# Patient Record
Sex: MALE | Race: BLACK OR AFRICAN AMERICAN | NOT HISPANIC OR LATINO | Employment: FULL TIME | URBAN - METROPOLITAN AREA
[De-identification: names, ages, dates, MRNs, and addresses within clinical notes are randomized per-mention and may not be internally consistent; named-entity substitution may affect disease eponyms.]

---

## 2024-08-26 ENCOUNTER — HOSPITAL ENCOUNTER (INPATIENT)
Facility: HOSPITAL | Age: 54
LOS: 1 days | Discharge: HOME | DRG: 065 | End: 2024-08-27
Attending: STUDENT IN AN ORGANIZED HEALTH CARE EDUCATION/TRAINING PROGRAM | Admitting: PSYCHIATRY & NEUROLOGY
Payer: COMMERCIAL

## 2024-08-26 DIAGNOSIS — I63.312 CEREBROVASCULAR ACCIDENT (CVA) DUE TO THROMBOSIS OF LEFT MIDDLE CEREBRAL ARTERY: ICD-10-CM

## 2024-08-26 DIAGNOSIS — I63.9 STROKE: ICD-10-CM

## 2024-08-26 DIAGNOSIS — I49.9 DYSRHYTHMIA: ICD-10-CM

## 2024-08-26 DIAGNOSIS — R29.818 ACUTE FOCAL NEUROLOGICAL DEFICIT: ICD-10-CM

## 2024-08-26 DIAGNOSIS — R00.0 TACHYCARDIA: ICD-10-CM

## 2024-08-26 DIAGNOSIS — K11.8 PAROTID NODULE: Primary | ICD-10-CM

## 2024-08-26 PROBLEM — R73.03 PREDIABETES: Status: ACTIVE | Noted: 2024-08-26

## 2024-08-26 PROBLEM — G81.90 HEMIPARESIS: Status: ACTIVE | Noted: 2024-08-26

## 2024-08-26 PROBLEM — E87.6 HYPOKALEMIA: Status: ACTIVE | Noted: 2024-08-26

## 2024-08-26 PROBLEM — E78.2 HYPERLIPIDEMIA, MIXED: Status: ACTIVE | Noted: 2024-08-26

## 2024-08-26 LAB
ALBUMIN SERPL BCP-MCNC: 3.3 G/DL (ref 3.5–5.2)
ALP SERPL-CCNC: 68 U/L (ref 55–135)
ALT SERPL W/O P-5'-P-CCNC: 39 U/L (ref 10–44)
ANION GAP SERPL CALC-SCNC: 12 MMOL/L (ref 8–16)
ASCENDING AORTA: 3.08 CM
AST SERPL-CCNC: 25 U/L (ref 10–40)
AV INDEX (PROSTH): 0.81
AV MEAN GRADIENT: 5 MMHG
AV PEAK GRADIENT: 8 MMHG
AV VALVE AREA BY VELOCITY RATIO: 2.97 CM²
AV VALVE AREA: 3.35 CM²
AV VELOCITY RATIO: 0.72
BASOPHILS # BLD AUTO: 0.1 K/UL (ref 0–0.2)
BASOPHILS NFR BLD: 1.4 % (ref 0–1.9)
BILIRUB SERPL-MCNC: 0.8 MG/DL (ref 0.1–1)
BSA FOR ECHO PROCEDURE: 2.09 M2
BUN SERPL-MCNC: 11 MG/DL (ref 6–20)
CALCIUM SERPL-MCNC: 9.4 MG/DL (ref 8.7–10.5)
CHLORIDE SERPL-SCNC: 106 MMOL/L (ref 95–110)
CHOLEST SERPL-MCNC: 185 MG/DL (ref 120–199)
CHOLEST/HDLC SERPL: 7.1 {RATIO} (ref 2–5)
CO2 SERPL-SCNC: 22 MMOL/L (ref 23–29)
CREAT SERPL-MCNC: 1.3 MG/DL (ref 0.5–1.4)
CREAT SERPL-MCNC: 1.4 MG/DL (ref 0.5–1.4)
CV ECHO LV RWT: 0.26 CM
DIFFERENTIAL METHOD BLD: ABNORMAL
DOP CALC AO PEAK VEL: 1.44 M/S
DOP CALC AO VTI: 25.32 CM
DOP CALC LVOT AREA: 4.1 CM2
DOP CALC LVOT DIAMETER: 2.29 CM
DOP CALC LVOT PEAK VEL: 1.04 M/S
DOP CALC LVOT STROKE VOLUME: 84.8 CM3
DOP CALCLVOT PEAK VEL VTI: 20.6 CM
E WAVE DECELERATION TIME: 150.34 MSEC
E/A RATIO: 2
E/E' RATIO: 5.52 M/S
ECHO LV POSTERIOR WALL: 0.76 CM (ref 0.6–1.1)
EOSINOPHIL # BLD AUTO: 0.3 K/UL (ref 0–0.5)
EOSINOPHIL NFR BLD: 4.4 % (ref 0–8)
ERYTHROCYTE [DISTWIDTH] IN BLOOD BY AUTOMATED COUNT: 13.1 % (ref 11.5–14.5)
EST. GFR  (NO RACE VARIABLE): >60 ML/MIN/1.73 M^2
ESTIMATED AVG GLUCOSE: 131 MG/DL (ref 68–131)
FRACTIONAL SHORTENING: 36 % (ref 28–44)
GLUCOSE SERPL-MCNC: 141 MG/DL (ref 70–110)
HBA1C MFR BLD: 6.2 % (ref 4–5.6)
HCT VFR BLD AUTO: 37.2 % (ref 40–54)
HDLC SERPL-MCNC: 26 MG/DL (ref 40–75)
HDLC SERPL: 14.1 % (ref 20–50)
HGB BLD-MCNC: 11.8 G/DL (ref 14–18)
IMM GRANULOCYTES # BLD AUTO: 0.03 K/UL (ref 0–0.04)
IMM GRANULOCYTES NFR BLD AUTO: 0.4 % (ref 0–0.5)
INR PPP: 1 (ref 0.8–1.2)
INTERVENTRICULAR SEPTUM: 0.79 CM (ref 0.6–1.1)
IVRT: 98.95 MSEC
LA MAJOR: 5.21 CM
LA MINOR: 5.21 CM
LA WIDTH: 3.64 CM
LDLC SERPL CALC-MCNC: 122.6 MG/DL (ref 63–159)
LEFT ATRIUM SIZE: 3.87 CM
LEFT ATRIUM VOLUME INDEX MOD: 21.2 ML/M2
LEFT ATRIUM VOLUME INDEX: 30.6 ML/M2
LEFT ATRIUM VOLUME MOD: 43.18 CM3
LEFT ATRIUM VOLUME: 62.38 CM3
LEFT INTERNAL DIMENSION IN SYSTOLE: 3.79 CM (ref 2.1–4)
LEFT VENTRICLE DIASTOLIC VOLUME INDEX: 85.41 ML/M2
LEFT VENTRICLE DIASTOLIC VOLUME: 174.24 ML
LEFT VENTRICLE MASS INDEX: 86 G/M2
LEFT VENTRICLE SYSTOLIC VOLUME INDEX: 30.2 ML/M2
LEFT VENTRICLE SYSTOLIC VOLUME: 61.52 ML
LEFT VENTRICULAR INTERNAL DIMENSION IN DIASTOLE: 5.92 CM (ref 3.5–6)
LEFT VENTRICULAR MASS: 174.79 G
LV LATERAL E/E' RATIO: 4.46 M/S
LV SEPTAL E/E' RATIO: 7.25 M/S
LYMPHOCYTES # BLD AUTO: 1.8 K/UL (ref 1–4.8)
LYMPHOCYTES NFR BLD: 25.4 % (ref 18–48)
MCH RBC QN AUTO: 27.2 PG (ref 27–31)
MCHC RBC AUTO-ENTMCNC: 31.7 G/DL (ref 32–36)
MCV RBC AUTO: 86 FL (ref 82–98)
MONOCYTES # BLD AUTO: 1.1 K/UL (ref 0.3–1)
MONOCYTES NFR BLD: 15.1 % (ref 4–15)
MV A" WAVE DURATION": 8.37 MSEC
MV PEAK A VEL: 0.29 M/S
MV PEAK E VEL: 0.58 M/S
MV STENOSIS PRESSURE HALF TIME: 43.6 MS
MV VALVE AREA P 1/2 METHOD: 5.05 CM2
NEUTROPHILS # BLD AUTO: 3.8 K/UL (ref 1.8–7.7)
NEUTROPHILS NFR BLD: 53.3 % (ref 38–73)
NONHDLC SERPL-MCNC: 159 MG/DL
NRBC BLD-RTO: 0 /100 WBC
OHS LV EJECTION FRACTION SIMPSONS BIPLANE MOD: 57 %
OHS QRS DURATION: 98 MS
OHS QTC CALCULATION: 462 MS
PISA TR MAX VEL: 1.97 M/S
PLATELET # BLD AUTO: 337 K/UL (ref 150–450)
PMV BLD AUTO: 8.9 FL (ref 9.2–12.9)
POC PTINR: 1.1 (ref 0.9–1.2)
POC PTWBT: 13 SEC (ref 9.7–14.3)
POTASSIUM SERPL-SCNC: 3.4 MMOL/L (ref 3.5–5.1)
PROT SERPL-MCNC: 7.4 G/DL (ref 6–8.4)
PROTHROMBIN TIME: 10.7 SEC (ref 9–12.5)
PULM VEIN S/D RATIO: 1.13
PV PEAK D VEL: 0.54 M/S
PV PEAK S VEL: 0.61 M/S
RA MAJOR: 4.78 CM
RA PRESSURE ESTIMATED: 3 MMHG
RA WIDTH: 4.29 CM
RBC # BLD AUTO: 4.34 M/UL (ref 4.6–6.2)
RIGHT VENTRICLE DIASTOLIC BASEL DIMENSION: 3.6 CM
RV TB RVSP: 5 MMHG
SAMPLE: NORMAL
SAMPLE: NORMAL
SINUS: 2.94 CM
SODIUM SERPL-SCNC: 140 MMOL/L (ref 136–145)
STJ: 2.38 CM
TDI LATERAL: 0.13 M/S
TDI SEPTAL: 0.08 M/S
TDI: 0.11 M/S
TR MAX PG: 16 MMHG
TRICUSPID ANNULAR PLANE SYSTOLIC EXCURSION: 2.21 CM
TRIGL SERPL-MCNC: 182 MG/DL (ref 30–150)
TROPONIN I SERPL DL<=0.01 NG/ML-MCNC: 0.02 NG/ML (ref 0–0.03)
TSH SERPL DL<=0.005 MIU/L-ACNC: 2.02 UIU/ML (ref 0.4–4)
TV REST PULMONARY ARTERY PRESSURE: 19 MMHG
WBC # BLD AUTO: 7.1 K/UL (ref 3.9–12.7)
Z-SCORE OF LEFT VENTRICULAR DIMENSION IN END DIASTOLE: -0.27
Z-SCORE OF LEFT VENTRICULAR DIMENSION IN END SYSTOLE: 0.13

## 2024-08-26 PROCEDURE — 93010 ELECTROCARDIOGRAM REPORT: CPT | Mod: ,,, | Performed by: INTERNAL MEDICINE

## 2024-08-26 PROCEDURE — 83036 HEMOGLOBIN GLYCOSYLATED A1C: CPT | Performed by: NURSE PRACTITIONER

## 2024-08-26 PROCEDURE — 85025 COMPLETE CBC W/AUTO DIFF WBC: CPT

## 2024-08-26 PROCEDURE — 25500020 PHARM REV CODE 255: Performed by: STUDENT IN AN ORGANIZED HEALTH CARE EDUCATION/TRAINING PROGRAM

## 2024-08-26 PROCEDURE — 25000003 PHARM REV CODE 250: Performed by: STUDENT IN AN ORGANIZED HEALTH CARE EDUCATION/TRAINING PROGRAM

## 2024-08-26 PROCEDURE — 99285 EMERGENCY DEPT VISIT HI MDM: CPT | Mod: 25

## 2024-08-26 PROCEDURE — 25000003 PHARM REV CODE 250

## 2024-08-26 PROCEDURE — 99900035 HC TECH TIME PER 15 MIN (STAT)

## 2024-08-26 PROCEDURE — 93005 ELECTROCARDIOGRAM TRACING: CPT

## 2024-08-26 PROCEDURE — 80061 LIPID PANEL: CPT

## 2024-08-26 PROCEDURE — 84443 ASSAY THYROID STIM HORMONE: CPT

## 2024-08-26 PROCEDURE — 80053 COMPREHEN METABOLIC PANEL: CPT

## 2024-08-26 PROCEDURE — 94761 N-INVAS EAR/PLS OXIMETRY MLT: CPT | Mod: XB

## 2024-08-26 PROCEDURE — 63600175 PHARM REV CODE 636 W HCPCS: Performed by: NURSE PRACTITIONER

## 2024-08-26 PROCEDURE — 85610 PROTHROMBIN TIME: CPT

## 2024-08-26 PROCEDURE — 99223 1ST HOSP IP/OBS HIGH 75: CPT | Mod: ,,, | Performed by: PSYCHIATRY & NEUROLOGY

## 2024-08-26 PROCEDURE — 11000001 HC ACUTE MED/SURG PRIVATE ROOM

## 2024-08-26 PROCEDURE — 82565 ASSAY OF CREATININE: CPT

## 2024-08-26 PROCEDURE — 82803 BLOOD GASES ANY COMBINATION: CPT

## 2024-08-26 PROCEDURE — 25000003 PHARM REV CODE 250: Performed by: NURSE PRACTITIONER

## 2024-08-26 PROCEDURE — 84484 ASSAY OF TROPONIN QUANT: CPT

## 2024-08-26 RX ORDER — ENOXAPARIN SODIUM 100 MG/ML
40 INJECTION SUBCUTANEOUS EVERY 24 HOURS
Status: DISCONTINUED | OUTPATIENT
Start: 2024-08-26 | End: 2024-08-27 | Stop reason: HOSPADM

## 2024-08-26 RX ORDER — ATORVASTATIN CALCIUM 40 MG/1
40 TABLET, FILM COATED ORAL DAILY
Status: DISCONTINUED | OUTPATIENT
Start: 2024-08-26 | End: 2024-08-27 | Stop reason: HOSPADM

## 2024-08-26 RX ORDER — BISACODYL 10 MG/1
10 SUPPOSITORY RECTAL DAILY PRN
Status: DISCONTINUED | OUTPATIENT
Start: 2024-08-26 | End: 2024-08-27 | Stop reason: HOSPADM

## 2024-08-26 RX ORDER — NAPROXEN SODIUM 220 MG/1
81 TABLET, FILM COATED ORAL DAILY
Status: DISCONTINUED | OUTPATIENT
Start: 2024-08-26 | End: 2024-08-27 | Stop reason: HOSPADM

## 2024-08-26 RX ORDER — SODIUM CHLORIDE 0.9 % (FLUSH) 0.9 %
10 SYRINGE (ML) INJECTION
Status: DISCONTINUED | OUTPATIENT
Start: 2024-08-26 | End: 2024-08-27 | Stop reason: HOSPADM

## 2024-08-26 RX ORDER — CALCIUM CARBONATE 200(500)MG
500 TABLET,CHEWABLE ORAL 3 TIMES DAILY PRN
Status: DISCONTINUED | OUTPATIENT
Start: 2024-08-26 | End: 2024-08-27 | Stop reason: HOSPADM

## 2024-08-26 RX ORDER — LABETALOL HCL 20 MG/4 ML
10 SYRINGE (ML) INTRAVENOUS EVERY 6 HOURS PRN
Status: DISCONTINUED | OUTPATIENT
Start: 2024-08-26 | End: 2024-08-27 | Stop reason: HOSPADM

## 2024-08-26 RX ORDER — CLOPIDOGREL BISULFATE 75 MG/1
75 TABLET ORAL DAILY
Status: DISCONTINUED | OUTPATIENT
Start: 2024-08-26 | End: 2024-08-27 | Stop reason: HOSPADM

## 2024-08-26 RX ORDER — METOPROLOL TARTRATE 25 MG/1
25 TABLET, FILM COATED ORAL ONCE
Status: COMPLETED | OUTPATIENT
Start: 2024-08-26 | End: 2024-08-26

## 2024-08-26 RX ORDER — ONDANSETRON HYDROCHLORIDE 2 MG/ML
4 INJECTION, SOLUTION INTRAVENOUS EVERY 12 HOURS PRN
Status: DISCONTINUED | OUTPATIENT
Start: 2024-08-26 | End: 2024-08-27 | Stop reason: HOSPADM

## 2024-08-26 RX ORDER — ACETAMINOPHEN 325 MG/1
650 TABLET ORAL EVERY 6 HOURS PRN
Status: DISCONTINUED | OUTPATIENT
Start: 2024-08-26 | End: 2024-08-27 | Stop reason: HOSPADM

## 2024-08-26 RX ADMIN — ACETAMINOPHEN 650 MG: 325 TABLET ORAL at 05:08

## 2024-08-26 RX ADMIN — ASPIRIN 81 MG CHEWABLE TABLET 81 MG: 81 TABLET CHEWABLE at 09:08

## 2024-08-26 RX ADMIN — CLOPIDOGREL BISULFATE 75 MG: 75 TABLET ORAL at 03:08

## 2024-08-26 RX ADMIN — POTASSIUM BICARBONATE 30 MEQ: 391 TABLET, EFFERVESCENT ORAL at 09:08

## 2024-08-26 RX ADMIN — METOPROLOL TARTRATE 25 MG: 25 TABLET, FILM COATED ORAL at 06:08

## 2024-08-26 RX ADMIN — ATORVASTATIN CALCIUM 40 MG: 40 TABLET, FILM COATED ORAL at 09:08

## 2024-08-26 RX ADMIN — ENOXAPARIN SODIUM 40 MG: 40 INJECTION SUBCUTANEOUS at 06:08

## 2024-08-26 RX ADMIN — IOHEXOL 100 ML: 350 INJECTION, SOLUTION INTRAVENOUS at 02:08

## 2024-08-26 NOTE — ED TRIAGE NOTES
Yannick Santiago, a 53 y.o. male presents to the ED w/ complaint of R sided slurred speech, R sided facial droop, and R arm/ R leg weakness starting when he woke up, Pt went to sleep at approx 2100 last night.     Triage note:  Chief Complaint   Patient presents with    Facial Droop    Extremity Weakness    slurred speech     Pt arrives via EMS after waking up with slurred speech, right sided limb weakness and right sided facial droop.     Review of patient's allergies indicates:  No Known Allergies  No past medical history on file.

## 2024-08-26 NOTE — NURSING
Nurses Note -- 4 Eyes      8/26/2024   6:03 AM      Skin assessed during: Admit      [x] No Altered Skin Integrity Present    [x]Prevention Measures Documented      [] Yes- Altered Skin Integrity Present or Discovered   [] LDA Added if Not in Epic (Describe Wound)   [] New Altered Skin Integrity was Present on Admit and Documented in LDA   [] Wound Image Taken    Wound Care Consulted? No    Attending Nurse:  Vanessa Cage RN/Staff Member:   SABINA Ruiz

## 2024-08-26 NOTE — HPI
54 y/o male who went to sleep at 2100 and woke up at 0130 with right sided facial droop, dysarthria and right sided weakness.  Patient brought in by EMS.  Stroke code activated  Upon my examination patient symptoms have resolved NIHSS 0  Initial /83  CTA head and neck multiphase with no LVO or acute process seen. No thrombolytics as out of window and resolved symptoms.   Patient has not seen a MD in years and is not on any meds.  MRI brain w/o contrast

## 2024-08-26 NOTE — CARE UPDATE
I have reviewed the chart of Yannick Santiago who is hospitalized for the following:    Active Hospital Problems    Diagnosis    *Cerebrovascular accident (CVA) due to thrombosis of left middle cerebral artery    Hyperlipidemia, mixed    Prediabetes    Parotid nodule    Body mass index (BMI) of 32.0 to 32.9 in adult    Hemiparesis     Therapy to evaluate and treat       Hypokalemia     POA  Monitor with daily cmp replace          Daphney Huynh NP  [facility]

## 2024-08-26 NOTE — ED PROVIDER NOTES
Encounter Date: 8/26/2024       History     Chief Complaint   Patient presents with    Facial Droop    Extremity Weakness    slurred speech     Pt arrives via EMS after waking up with slurred speech, right sided limb weakness and right sided facial droop.     Pt is a 53 year old male who presents for evaluation of slurred speech, which was noticed upon waking at approximately 1:30 AM. Pt states he went to bed at 9 PM without symptoms. He awoke with slurred speech noticed by his significant other. EMS called who noted development of right sided weakness and right facial droop. Pt denies any hx of similar symptoms. No blood thinner medication regimen.     The history is provided by the patient.     Review of patient's allergies indicates:  No Known Allergies  History reviewed. No pertinent past medical history.  History reviewed. No pertinent surgical history.  No family history on file.  Social History     Tobacco Use    Smoking status: Former     Types: Cigarettes   Substance Use Topics    Alcohol use: No    Drug use: No     Review of Systems    Physical Exam     Initial Vitals   BP Pulse Resp Temp SpO2   08/26/24 0213 08/26/24 0213 08/26/24 0213 08/26/24 0229 08/26/24 0213   (!) 155/90 108 (!) 24 98.3 °F (36.8 °C) 100 %      MAP       --                Physical Exam    Nursing note and vitals reviewed.  Constitutional: He appears well-developed and well-nourished. No distress.   HENT:   Head: Normocephalic and atraumatic.   Eyes: EOM are normal. Pupils are equal, round, and reactive to light.   Neck: Neck supple.   Normal range of motion.  Cardiovascular:  Normal rate, regular rhythm and intact distal pulses.           No murmur heard.  Pulmonary/Chest: Breath sounds normal. No respiratory distress. He has no wheezes. He has no rales.   Abdominal: Abdomen is soft. He exhibits no distension.   Musculoskeletal:         General: No edema.      Cervical back: Normal range of motion and neck supple.     Neurological: He  is alert and oriented to person, place, and time. No sensory deficit.   5/5 strength in LUE and LLE. 3/5 strength RLE. 1/5 strength RUE. Right facial droop. Mild slurred speech   Skin: Skin is warm and dry.         ED Course   Procedures  Labs Reviewed   CBC W/ AUTO DIFFERENTIAL - Abnormal       Result Value    WBC 7.10      RBC 4.34 (*)     Hemoglobin 11.8 (*)     Hematocrit 37.2 (*)     MCV 86      MCH 27.2      MCHC 31.7 (*)     RDW 13.1      Platelets 337      MPV 8.9 (*)     Immature Granulocytes 0.4      Gran # (ANC) 3.8      Immature Grans (Abs) 0.03      Lymph # 1.8      Mono # 1.1 (*)     Eos # 0.3      Baso # 0.10      nRBC 0      Gran % 53.3      Lymph % 25.4      Mono % 15.1 (*)     Eosinophil % 4.4      Basophil % 1.4      Differential Method Automated     COMPREHENSIVE METABOLIC PANEL - Abnormal    Sodium 140      Potassium 3.4 (*)     Chloride 106      CO2 22 (*)     Glucose 141 (*)     BUN 11      Creatinine 1.3      Calcium 9.4      Total Protein 7.4      Albumin 3.3 (*)     Total Bilirubin 0.8      Alkaline Phosphatase 68      AST 25      ALT 39      eGFR >60.0      Anion Gap 12     LIPID PANEL - Abnormal    Cholesterol 185      Triglycerides 182 (*)     HDL 26 (*)     LDL Cholesterol 122.6      HDL/Cholesterol Ratio 14.1 (*)     Total Cholesterol/HDL Ratio 7.1 (*)     Non-HDL Cholesterol 159     HEMOGLOBIN A1C - Abnormal    Hemoglobin A1C 6.2 (*)     Estimated Avg Glucose 131     PROTIME-INR    Prothrombin Time 10.7      INR 1.0     TSH    TSH 2.025     TROPONIN I   POCT GLUCOSE, HAND-HELD DEVICE   ISTAT CREATININE    POC Creatinine 1.4      Sample unknown     ISTAT PROCEDURE    POC PTWBT 13.0      POC PTINR 1.1      Sample unknown          ECG Results              ECG 12 lead (Final result)        Collection Time Result Time QRS Duration OHS QTC Calculation    08/26/24 02:27:08 08/26/24 08:30:58 98 462                     Final result by Interface, Lab In [facility] (08/26/24 08:31:05)                    Narrative:    Test Reason : R29.818,    Vent. Rate : 095 BPM     Atrial Rate : 095 BPM     P-R Int : 152 ms          QRS Dur : 098 ms      QT Int : 368 ms       P-R-T Axes : 055 074 014 degrees     QTc Int : 462 ms    Normal sinus rhythm  Low voltage QRS  Borderline Abnormal ECG  When compared with ECG of 19-OCT-2015 13:59,  The axis Shifted left  Nonspecific T wave abnormality has replaced inverted T waves in Inferior  leads  Nonspecific T wave abnormality has replaced inverted T waves in Lateral  leads  QT has lengthened  Confirmed by Bhupendra MATTHEWS MD ([id number]) on 8/26/2024 8:30:54 AM    Referred By: [email or web address]   SELF           Confirmed By:Bhupendra MATTHEWS MD                                  Imaging Results               MRI Brain Without Contrast (Final result)  Result time 08/26/24 03:26:34      Final result by Lynette Palmer MD (08/26/24 03:26:34)                   Impression:      Small focus of restricted diffusion within the posterior limb of the left internal capsule concerning for small focus of acute ischemia.  No evidence of associated hemorrhage or hydrocephalus.    Paranasal sinus disease.    This report was flagged in Epic as abnormal.    Electronically signed by resident: Adriana Peacock  Date:    08/26/2024  Time:    03:04    Electronically signed by: Lynette Palmer MD  Date:    08/26/2024  Time:    03:26               Narrative:    EXAMINATION:  MRI BRAIN WITHOUT CONTRAST    CLINICAL HISTORY:  Stroke, follow up;    TECHNIQUE:  Multiplanar multisequence MR imaging of the brain was performed without intravenous contrast.    COMPARISON:  CTA 08/26/2024    FINDINGS:  Ventricles are normal in size for age without evidence of hydrocephalus.    Small 1.7 cm focus of restricted diffusion in the posterior limb of the left internal capsule, suggestive of acute ischemia.  No mass effect, midline shift, or hemorrhage.  There are a few small scattered foci of T2/FLAIR hyperintensity in the supratentorial white matter,  nonspecific but suggestive of sequela of chronic microvascular ischemic change.    No extra-axial blood or fluid collections.    Normal vascular flow voids are preserved.    Paranasal sinus disease with patchy opacification and mucosal thickening through the paranasal sinuses, most pronounced involving the ethmoid air cells.  Bone marrow signal intensity is normal.                                        CTA STROKE MULTI-PHASE (Final result)  Result time 08/26/24 02:58:55      Final result by Edward Don MD (08/26/24 02:58:55)                   Impression:      CTA Stroke Multiphase:    No acute intracranial hemorrhage, intracranial mass effect, or discrete large vascular territory brain infarct.    No major vessel high-grade stenosis or occlusion in the craniocervical cerebrovascular arterial circulation.    Incidental 11 x 7 mm right intra-parotid lymph node, versus a small intraparotid tumor.  Recommendations include clinical correlation and outpatient ENT follow-up.    Paranasal sinus disease, as detailed in the body of the report.    This report was flagged in Epic as abnormal.    Electronically signed by resident: Adriana Peacock  Date:    08/26/2024  Time:    02:22    Electronically signed by: Edward Don  Date:    08/26/2024  Time:    02:58               Narrative:    EXAMINATION:  CTA STROKE MULTI-PHASE    CLINICAL HISTORY:  Neuro deficit, acute, stroke suspected;    TECHNIQUE:  Non contrast low dose axial images were obtained thought the head. CT angiogram was performed from the level of the miriam to the top of the head following the IV administration of 100 mL of Omnipaque 350.   Sagittal and coronal reconstructions and maximum intensity projection reconstructions were performed. Arterial stenosis percentages are based on NASCET measurement criteria.  Two additional phases of immediate post-contrast CTA images were performed through the head alone.    COMPARISON:  None.    FINDINGS:  CT HEAD:    No  evidence of acute territorial infarct, hemorrhage, mass effect, or midline shift.    Ventricles are normal in size and configuration.    No extra-axial blood or fluid collection.    No displaced calvarial fracture.    There is mucosal thickening and patchy opacification of the paranasal sinuses, most pronounced involving the bilateral ethmoid air cells, sphenoid sinuses, and the left maxillary sinus.    Mastoid air cells are clear.    CTA HEAD AND NECK:    Soft tissues: An 11 x 7 mm oval nodular opacity in the posterosuperior aspect of the superficial lobe of the right parotid gland is favored to represent an intraparotid lymph node; however, a intraparotid tumor is not fully excluded.    Spine maintains normal alignment without significant degenerative changes.    Aorta: Normal 3 vessel arch.    Extracranial carotid circulation: No hemodynamically significant stenosis, aneurysmal dilatation, or dissection.  No carotid web.    Extracranial vertebral circulation: No hemodynamically significant stenosis, aneurysmal dilatation, or dissection.    Intracranial Arteries: No focal high-grade stenosis, occlusion, or aneurysm.    Venous structures (limited evaluation): Normal.                                       Medications   sodium chloride 0.9% flush 10 mL (has no administration in time range)   sodium chloride 0.9% bolus 500 mL 500 mL (has no administration in time range)   labetalol 20 mg/4 mL (5 mg/mL) IV syring (has no administration in time range)   bisacodyL suppository 10 mg (has no administration in time range)   enoxaparin injection 40 mg (has no administration in time range)   acetaminophen tablet 650 mg (650 mg Oral Given 8/26/24 0555)   aspirin chewable tablet 81 mg (81 mg Oral Given 8/26/24 0912)   atorvastatin tablet 40 mg (40 mg Oral Given 8/26/24 0912)   ondansetron injection 4 mg (has no administration in time range)   calcium carbonate 200 mg calcium (500 mg) chewable tablet 500 mg (has no  administration in time range)   clopidogreL tablet 75 mg (75 mg Oral Given 8/26/24 1512)   iohexoL (OMNIPAQUE 350) injection 100 mL (100 mLs Intravenous Given 8/26/24 0210)   potassium bicarbonate disintegrating tablet 30 mEq (30 mEq Oral Given 8/26/24 0955)     Medical Decision Making  Pt presents for evaluation of slurred speech and right sided weakness. Stroke code activated upon arrival to ED. CTA head and neck negative for acute process. Pt's symptoms have resolved since presentation to the ED. Vascular neurology consulted who evaluated the pt in the ED. Recommenced MRI brain. MRI concerning for acute stroke. Pt admitted to vascular neurology for further management and evaluation     Amount and/or Complexity of Data Reviewed  Labs: ordered.  Radiology: ordered.    Risk  Prescription drug management.  Decision regarding hospitalization.               ED Course as of 08/26/24 1743   Mon Aug 26, 2024   0243 EKG with normal sinus rhythm, rate 95, no STEMI [NN]      ED Course User Index  [NN] Yazmin Coffey MD                           Clinical Impression:  Final diagnoses:  [R29.818] Acute focal neurological deficit  [I63.312] Cerebrovascular accident (CVA) due to thrombosis of left middle cerebral artery          ED Disposition Condition    Admit                 Dov Wang Jr., MD  Resident  08/26/24 1743

## 2024-08-26 NOTE — SUBJECTIVE & OBJECTIVE
History reviewed. No pertinent past medical history.  History reviewed. No pertinent surgical history.  Social History     Tobacco Use    Smoking status: Former     Types: Cigarettes   Substance Use Topics    Alcohol use: No    Drug use: No     Review of patient's allergies indicates:  No Known Allergies    Medications: I have reviewed the current medication administration record.    (Not in a hospital admission)      Review of Systems   Constitutional:  Negative for chills and fever.   HENT:  Negative for ear discharge and ear pain.    Eyes:  Negative for pain and redness.   Respiratory:  Negative for cough and shortness of breath.    Cardiovascular:  Negative for chest pain and leg swelling.   Gastrointestinal:  Negative for abdominal distention and abdominal pain.   Endocrine: Negative for polydipsia and polyphagia.   Genitourinary:  Negative for dysuria and enuresis.   Musculoskeletal:  Negative for arthralgias and back pain.   Skin:  Negative for rash and wound.   Allergic/Immunologic: Negative for environmental allergies and food allergies.   Neurological:  Positive for facial asymmetry, speech difficulty and weakness.   Hematological:  Does not bruise/bleed easily.   Psychiatric/Behavioral:  Negative for agitation and confusion.      Objective:     Vital Signs (Most Recent):  Temp: 98.3 °F (36.8 °C) (08/26/24 0229)  Pulse: 74 (08/26/24 0255)  Resp: 20 (08/26/24 0255)  BP: 125/85 (08/26/24 0255)  SpO2: 95 % (08/26/24 0255)    Vital Signs Range (Last 24H):  Temp:  [98.3 °F (36.8 °C)]   Pulse:  []   Resp:  [20-24]   BP: (125-155)/(83-90)   SpO2:  [95 %-100 %]        Physical Exam  Vitals and nursing note reviewed.   Constitutional:       Appearance: Normal appearance. He is normal weight.   HENT:      Head: Normocephalic and atraumatic.   Eyes:      Extraocular Movements: Extraocular movements intact.      Conjunctiva/sclera: Conjunctivae normal.      Pupils: Pupils are equal, round, and reactive to  light.   Cardiovascular:      Rate and Rhythm: Normal rate and regular rhythm.   Pulmonary:      Effort: Pulmonary effort is normal.      Breath sounds: Normal breath sounds.   Abdominal:      General: Abdomen is flat. Bowel sounds are normal.      Palpations: Abdomen is soft.   Musculoskeletal:         General: Normal range of motion.      Cervical back: Normal range of motion.   Skin:     General: Skin is warm and dry.   Neurological:      General: No focal deficit present.      Mental Status: He is alert and oriented to person, place, and time. Mental status is at baseline.   Psychiatric:         Mood and Affect: Mood normal.         Behavior: Behavior normal.              Neurological Exam:   LOC: alert  Attention Span: Good   Language: No aphasia  Articulation: No dysarthria  Orientation: Person, Place, Time   Visual Fields: Full  EOM (CN III, IV, VI): Full/intact  Pupils (CN II, III): PERRL  Facial Sensation (CN V): Normal  Facial Movement (CN VII): Symmetric facial expression    Gag Reflex: present  Reflexes: flexor plantar responses bilaterally  Motor: Arm left  Normal 5/5  Leg left  Normal 5/5  Arm right  Normal 5/5  Leg right Normal 5/5  Cerebellum: No evidence of appendicular or axial ataxia  Sensation: Intact to light touch, temperature and vibration  Tone: Normal tone throughout      Laboratory:  CMP:   Recent Labs   Lab 08/26/24 0229   CALCIUM 9.4   ALBUMIN 3.3*   PROT 7.4      K 3.4*   CO2 22*      BUN 11   CREATININE 1.3   ALKPHOS 68   ALT 39   AST 25   BILITOT 0.8     CBC:   Recent Labs   Lab 08/26/24 0229   WBC 7.10   RBC 4.34*   HGB 11.8*   HCT 37.2*      MCV 86   MCH 27.2   MCHC 31.7*     Lipid Panel:   Recent Labs   Lab 08/26/24 0229   CHOL 185   LDLCALC 122.6   HDL 26*   TRIG 182*     Coagulation:   Recent Labs   Lab 08/26/24 0229   INR 1.0     Hgb A1C:   Recent Labs   Lab 08/26/24 0229   HGBA1C 6.2*     TSH:   Recent Labs   Lab 08/26/24 0229   TSH 2.025        Diagnostic Results:      Brain imaging:  MRI Brain w/o contrast 8-26-24 results:    Small focus of restricted diffusion within the posterior limb of the left internal capsule concerning for small focus of acute ischemia.  No evidence of associated hemorrhage or hydrocephalus.     Paranasal sinus disease.    Vessel Imaging:  CTA head and neck multiphase 8-26-24 results:  No acute intracranial hemorrhage, intracranial mass effect, or discrete large vascular territory brain infarct.     No major vessel high-grade stenosis or occlusion in the craniocervical cerebrovascular arterial circulation.     Incidental 11 x 7 mm right intra-parotid lymph node, versus a small intraparotid tumor.  Recommendations include clinical correlation and outpatient ENT follow-up.    Cardiac Evaluation:   EKG 8-26-24 results:  Normal sinus rhythm Low voltage QRS Borderline Abnormal ECG When compared with ECG of 19-OCT-2015 13:59, The axis Shifted left Minimal criteria for Inferior infarct are no longer Present Nonspecific T wave abnormality has replaced inverted T waves in Inferior leads Nonspecific T wave abnormality has replaced inverted T waves in Lateral leads QT has lengthened

## 2024-08-26 NOTE — ASSESSMENT & PLAN NOTE
54 y/o male with episode of right facial droop, dysarthria and right sided weakness now resolved. CTA head and neck no LVO. No thrombolytics as wake up and symptoms resolved. MRI reveals L internal capsule infarct.  Etiology?    Antithrombotics:  ASA 81 mg daily    Statins: Lipitor 40 mg daily    Aggressive risk factor modification: HLD, Diet, Exercise, Obesity     Rehab efforts: The patient has been evaluated by a stroke team provider and the therapy needs have been fully considered based off the presenting complaints and exam findings. The following therapy evaluations are needed: None    Diagnostics ordered/pending: TTE to assess cardiac function/status     VTE prophylaxis: Enoxaparin (BMI > 40) 30 mg SQ every 12 hours  Mechanical prophylaxis: Place SCDs    BP parameters: Infarct: No intervention, SBP <220

## 2024-08-26 NOTE — PLAN OF CARE
[facility] (Hospital)  Initial Discharge Assessment       Primary Care Provider: Stephanie, Primary Doctor    Admission Diagnosis: Acute focal neurological deficit [R29.818]  Cerebrovascular accident (CVA) due to thrombosis of left middle cerebral artery [I63.312]    Admission Date: 8/26/2024  Expected Discharge Date:     Transition of Care Barriers: (P) Does not adhere to care plan    Payor: BLUE CROSS BLUE [facility] / Plan: BCBS ALL OUT OF STATE / Product Type: PPO /     Extended Emergency Contact Information  Primary Emergency Contact: Ana Pratt   [facility]  Home Phone: [phone]  Relation: Daughter    Discharge Plan A: (P) Home  Discharge Plan B: (P) Home Health, Home      [id number] - [place], [place] - [address] AT [facility] [address]  [address]  [facility] [address]  Phone: [phone] Fax: [phone]      Initial Assessment (most recent)       Adult Discharge Assessment - 08/26/24 1246          Discharge Assessment    Assessment Type Discharge Planning Assessment (P)      Confirmed/corrected address, phone number and insurance Yes (P)      Confirmed Demographics Correct on Facesheet (P)      Source of Information patient (P)      When was your last doctors appointment? -- (P)    no PCP or medical care    Communicated JENNIFER with patient/caregiver Yes (P)      Reason For Admission CVA (P)      People in Home alone (P)      Do you have help at home or someone to help you manage your care at home? Yes (P)      Who are your caregiver(s) and their phone number(s)? dtr Ana and GF (P)      Prior to hospitilization cognitive status: Unable to Assess (P)      Current cognitive status: Alert/Oriented (P)      Walking or Climbing Stairs Difficulty no (P)      Dressing/Bathing Difficulty no (P)      Home Layout Able to live on 1st floor (P)      Equipment Currently Used at Home none (P)      Readmission within 30 days? No (P)       Patient currently being followed by outpatient case management? No (P)      Do you currently have service(s) that help you manage your care at home? No (P)      Do you take prescription medications? No (P)      Do you have prescription coverage? Yes (P)      Coverage BCBS (P)      Do you have any problems affording any of your prescribed medications? TBD (P)      Who is going to help you get home at discharge? Girlfriend (P)      How do you get to doctors appointments? car, drives self (P)      Are you on dialysis? No (P)      Do you take coumadin? No (P)      Discharge Plan A Home (P)      Discharge Plan B Home Health;Home (P)      DME Needed Upon Discharge  none (P)      Discharge Plan discussed with: Patient (P)      Transition of Care Barriers Does not adhere to care plan (P)         Physical Activity    On average, how many days per week do you engage in moderate to strenuous exercise (like a brisk walk)? 3 days (P)      On average, how many minutes do you engage in exercise at this level? 30 min (P)         Financial Resource Strain    How hard is it for you to pay for the very basics like food, housing, medical care, and heating? Not hard at all (P)         Housing Stability    In the last 12 months, was there a time when you were not able to pay the mortgage or rent on time? No (P)      At any time in the past 12 months, were you homeless or living in a shelter (including now)? No (P)         Transportation Needs    Has the lack of transportation kept you from medical appointments, meetings, work or from getting things needed for daily living? No (P)         Food Insecurity    Within the past 12 months, you worried that your food would run out before you got the money to buy more. Never true (P)      Within the past 12 months, the food you bought just didn't last and you didn't have money to get more. Never true (P)         Stress    Do you feel stress - tense, restless, nervous, or anxious, or unable to  sleep at night because your mind is troubled all the time - these days? To some extent (P)         Social Isolation    How often do you feel lonely or isolated from those around you?  Rarely (P)         Alcohol Use    Q1: How often do you have a drink containing alcohol? Monthly or less (P)      Q2: How many drinks containing alcohol do you have on a typical day when you are drinking? 1 or 2 (P)      Q3: How often do you have six or more drinks on one occasion? Less than monthly (P)         Utilities    In the past 12 months has the electric, gas, oil, or water company threatened to shut off services in your home? No (P)         Health Literacy    How often do you need to have someone help you when you read instructions, pamphlets, or other written material from your doctor or pharmacy? Rarely (P)                  SW met with patient and a friend visiting at bedside.  Patient lives alone in a H with 4 steps.  He is independent with ADL's and ambulation.  Patient is not on HD or Coumadin.  Patient does not have a PCP or take any meds.   Patient will discharge home once medically cleared with no anticipated post acute services.  PCP appt will be scheduled.      Patient's dtr Ana or his GF can provide transport and support as needed.      Discharge Plan A and Plan B have been determined by review of patient's clinical status, future medical and therapeutic needs, and coverage/benefits for post-acute care in coordination with multidisciplinary team members.    Yesy Manzanares, CYDNEY  [facility]  [phone]

## 2024-08-26 NOTE — H&P
[facility] Emergency Dept  Vascular Neurology  [facility]  History & Physical    Inpatient consult to Vascular (Stroke) Neurology  Consult performed by: Noemi Warren NP  Consult ordered by: Dov Wang Jr., MD  Reason for consult: L internal capsule infarct        Assessment/Plan:     Patient is a 53 y.o. year old male with:    * Cerebrovascular accident (CVA) due to thrombosis of left middle cerebral artery  52 y/o male with episode of right facial droop, dysarthria and right sided weakness now resolved. CTA head and neck no LVO. No thrombolytics as wake up and symptoms resolved. MRI reveals L internal capsule infarct.  Etiology?    Antithrombotics:  ASA 81 mg daily    Statins: Lipitor 40 mg daily    Aggressive risk factor modification: HLD, Diet, Exercise, Obesity     Rehab efforts: The patient has been evaluated by a stroke team provider and the therapy needs have been fully considered based off the presenting complaints and exam findings. The following therapy evaluations are needed: None    Diagnostics ordered/pending: TTE to assess cardiac function/status     VTE prophylaxis: Enoxaparin (BMI > 40) 30 mg SQ every 12 hours  Mechanical prophylaxis: Place SCDs    BP parameters: Infarct: No intervention, SBP <220        Hyperlipidemia, mixed  Stroke risk factor  .6  Lipitor 40 mg daily    Prediabetes  Stroke risk factor  HgB A1C 6.2  Needs counseling on diet and exercise    Parotid nodule  As seen on CTA head and neck needs out patient f/u with ENT    Body mass index (BMI) of 32.0 to 32.9 in adult  Stroke risk factor   on diet and exercise        STROKE DOCUMENTATION     Acute Stroke Times   Last Known Normal Date: 08/25/24  Last Known Normal Time: 2100  Unknown Symptom Onset Date: Unknown Date  Unknown Symptom Onset Time: Unknown Time  Stroke Team Called Date: 08/26/24  Stroke Team Called Time: 0206  Stroke Team Arrival Date: 08/26/24  Stroke Team Arrival Time:  0212  Thrombolytic Therapy Recommended: No  CTA Interpretation Time: 0222  Thrombectomy Recommended: No    NIH Scale:  1a. Level of Consciousness: 0-->Alert, keenly responsive  1b. LOC Questions: 0-->Answers both questions correctly  1c. LOC Commands: 0-->Performs both tasks correctly  2. Best Gaze: 0-->Normal  3. Visual: 0-->No visual loss  4. Facial Palsy: 0-->Normal symmetrical movements  5a. Motor Arm, Left: 0-->No drift, limb holds 90 (or 45) degrees for full 10 secs  5b. Motor Arm, Right: 0-->No drift, limb holds 90 (or 45) degrees for full 10 secs  6a. Motor Leg, Left: 0-->No drift, leg holds 30 degree position for full 5 secs  6b. Motor Leg, Right: 0-->No drift, leg holds 30 degree position for full 5 secs  7. Limb Ataxia: 0-->Absent  8. Sensory: 0-->Normal, no sensory loss  9. Best Language: 0-->No aphasia, normal  10. Dysarthria: 0-->Normal  11. Extinction and Inattention (formerly Neglect): 0-->No abnormality  Total (NIH Stroke Scale): 0     Modified Nicole Score: 0  Cambridge Coma Scale:    ABCD2 Score:    [id number] Score:   HAS -BLED Score:   ICH Score:   Hunt & Young Classification:      Thrombolysis Candidate? No, Out of window - Symptom onset > 4.5 hours, Patient back to neurological baseline     Delays to Thrombolysis?  Not Applicable    Interventional Revascularization Candidate?   Is the patient eligible for mechanical endovascular reperfusion (TYE)?  No; No large vessel occlusion identified on imaging     Delays to Thrombectomy? Not Applicable    Hemorrhagic change of an Ischemic Stroke: Does this patient have an ischemic stroke with hemorrhagic changes? No         Subjective:     History of Present Illness:  54 y/o male who went to sleep at 2100 and woke up at 0130 with right sided facial droop, dysarthria and right sided weakness.  Patient brought in by EMS.  Stroke code activated  Upon my examination patient symptoms have resolved NIHSS 0  Initial /83  CTA head and neck multiphase with  no LVO or acute process seen. No thrombolytics as out of window and resolved symptoms.   Patient has not seen a MD in years and is not on any meds.  MRI brain w/o contrast            History reviewed. No pertinent past medical history.  History reviewed. No pertinent surgical history.  Social History     Tobacco Use    Smoking status: Former     Types: Cigarettes   Substance Use Topics    Alcohol use: No    Drug use: No     Review of patient's allergies indicates:  No Known Allergies    Medications: I have reviewed the current medication administration record.    (Not in a hospital admission)      Review of Systems   Constitutional:  Negative for chills and fever.   HENT:  Negative for ear discharge and ear pain.    Eyes:  Negative for pain and redness.   Respiratory:  Negative for cough and shortness of breath.    Cardiovascular:  Negative for chest pain and leg swelling.   Gastrointestinal:  Negative for abdominal distention and abdominal pain.   Endocrine: Negative for polydipsia and polyphagia.   Genitourinary:  Negative for dysuria and enuresis.   Musculoskeletal:  Negative for arthralgias and back pain.   Skin:  Negative for rash and wound.   Allergic/Immunologic: Negative for environmental allergies and food allergies.   Neurological:  Positive for facial asymmetry, speech difficulty and weakness.   Hematological:  Does not bruise/bleed easily.   Psychiatric/Behavioral:  Negative for agitation and confusion.      Objective:     Vital Signs (Most Recent):  Temp: 98.3 °F (36.8 °C) (08/26/24 0229)  Pulse: 74 (08/26/24 0255)  Resp: 20 (08/26/24 0255)  BP: 125/85 (08/26/24 0255)  SpO2: 95 % (08/26/24 0255)    Vital Signs Range (Last 24H):  Temp:  [98.3 °F (36.8 °C)]   Pulse:  []   Resp:  [20-24]   BP: (125-155)/(83-90)   SpO2:  [95 %-100 %]        Physical Exam  Vitals and nursing note reviewed.   Constitutional:       Appearance: Normal appearance. He is normal weight.   HENT:      Head: Normocephalic and  atraumatic.   Eyes:      Extraocular Movements: Extraocular movements intact.      Conjunctiva/sclera: Conjunctivae normal.      Pupils: Pupils are equal, round, and reactive to light.   Cardiovascular:      Rate and Rhythm: Normal rate and regular rhythm.   Pulmonary:      Effort: Pulmonary effort is normal.      Breath sounds: Normal breath sounds.   Abdominal:      General: Abdomen is flat. Bowel sounds are normal.      Palpations: Abdomen is soft.   Musculoskeletal:         General: Normal range of motion.      Cervical back: Normal range of motion.   Skin:     General: Skin is warm and dry.   Neurological:      General: No focal deficit present.      Mental Status: He is alert and oriented to person, place, and time. Mental status is at baseline.   Psychiatric:         Mood and Affect: Mood normal.         Behavior: Behavior normal.              Neurological Exam:   LOC: alert  Attention Span: Good   Language: No aphasia  Articulation: No dysarthria  Orientation: Person, Place, Time   Visual Fields: Full  EOM (CN III, IV, VI): Full/intact  Pupils (CN II, III): PERRL  Facial Sensation (CN V): Normal  Facial Movement (CN VII): Symmetric facial expression    Gag Reflex: present  Reflexes: flexor plantar responses bilaterally  Motor: Arm left  Normal 5/5  Leg left  Normal 5/5  Arm right  Normal 5/5  Leg right Normal 5/5  Cerebellum: No evidence of appendicular or axial ataxia  Sensation: Intact to light touch, temperature and vibration  Tone: Normal tone throughout      Laboratory:  CMP:   Recent Labs   Lab 08/26/24 0229   CALCIUM 9.4   ALBUMIN 3.3*   PROT 7.4      K 3.4*   CO2 22*      BUN 11   CREATININE 1.3   ALKPHOS 68   ALT 39   AST 25   BILITOT 0.8     CBC:   Recent Labs   Lab 08/26/24 0229   WBC 7.10   RBC 4.34*   HGB 11.8*   HCT 37.2*      MCV 86   MCH 27.2   MCHC 31.7*     Lipid Panel:   Recent Labs   Lab 08/26/24 0229   CHOL 185   LDLCALC 122.6   HDL 26*   TRIG 182*     Coagulation:    Recent Labs   Lab 08/26/24 0229   INR 1.0     Hgb A1C:   Recent Labs   Lab 08/26/24 0229   HGBA1C 6.2*     TSH:   Recent Labs   Lab 08/26/24 0229   TSH 2.025       Diagnostic Results:      Brain imaging:  MRI Brain w/o contrast 8-26-24 results:    Small focus of restricted diffusion within the posterior limb of the left internal capsule concerning for small focus of acute ischemia.  No evidence of associated hemorrhage or hydrocephalus.     Paranasal sinus disease.    Vessel Imaging:  CTA head and neck multiphase 8-26-24 results:  No acute intracranial hemorrhage, intracranial mass effect, or discrete large vascular territory brain infarct.     No major vessel high-grade stenosis or occlusion in the craniocervical cerebrovascular arterial circulation.     Incidental 11 x 7 mm right intra-parotid lymph node, versus a small intraparotid tumor.  Recommendations include clinical correlation and outpatient ENT follow-up.    Cardiac Evaluation:   EKG 8-26-24 results:  Normal sinus rhythm Low voltage QRS Borderline Abnormal ECG When compared with ECG of 19-OCT-2015 13:59, The axis Shifted left Minimal criteria for Inferior infarct are no longer Present Nonspecific T wave abnormality has replaced inverted T waves in Inferior leads Nonspecific T wave abnormality has replaced inverted T waves in Lateral leads QT has lengthened         Noemi Warren NP  [facility]  Department of Vascular Neurology   [facility] Dept

## 2024-08-26 NOTE — CONSULTS
Food & Nutrition  Education     Diet Education: Cardiac diet   Time Spent: 8 minutes   Learners: Patient     Handouts provided: Heart Healthy - Reduced Sodium Nutrition Therapy     Comments: Discussed importance of eating a balanced diet with whole grains, fruits and vegetables, and lean protein sources. Encouraged heart-healthy unsaturated fats while limiting saturated fats, and cholesterol intake. Reviewed high sodium foods that should be avoided. Food labels, salt free seasonings, and recommended sodium intake reviewed. All questions/concerns were addressed.      Follow-Up: yes     Please Re-consult as needed.   Thanks!  Blessing Tipton, MS, RD, LDN

## 2024-08-26 NOTE — NURSING
Patient Transferred to NPU Room 908       Upon arrival to the floor, patient greeted and oriented to room. Complete head to toe assessment completed per protocol. VSS, see flowsheet for details. Neuro assessment completed. Primary team notified of patient's transfer to floor. All current and transfer orders reviewed/reconciled per protocol. All emergency equipment set up in patient's room. Fall/seizure precautions initiated per providers orders. 4 Eyes skin assessment performed, see below for details. Reviewed assessment and rounding frequency with patient and family. Questions were encouraged and addressed. Repositioned patient for comfort with bed locked in lowest position, side rails up x 2, bed alarm set, and call light within reach. Instructed patient to call staff for mobility/assistance, verbalized understanding. No acute signs of distress noted at this time.           +++++ Special Considerations+++++++  Nurses Note -- 4 Eyes      8/26/2024   6:14 AM      Skin assessed during: Admit      [x] No Altered Skin Integrity Present    [x]Prevention Measures Documented      [] Yes- Altered Skin Integrity Present or Discovered   [] LDA Added if Not in Epic (Describe Wound)   [] New Altered Skin Integrity was Present on Admit and Documented in LDA   [] Wound Image Taken    Wound Care Consulted? No    Attending Nurse:  Vanessa Cage RN/Staff Member:              Pt has cellphone,  and slippers. Significant other took the remainder of his belongings home.

## 2024-08-27 VITALS
OXYGEN SATURATION: 96 % | HEIGHT: 67 IN | WEIGHT: 203.5 LBS | SYSTOLIC BLOOD PRESSURE: 147 MMHG | DIASTOLIC BLOOD PRESSURE: 89 MMHG | RESPIRATION RATE: 18 BRPM | BODY MASS INDEX: 31.94 KG/M2 | HEART RATE: 72 BPM | TEMPERATURE: 98 F

## 2024-08-27 PROBLEM — E87.6 HYPOKALEMIA: Status: RESOLVED | Noted: 2024-08-26 | Resolved: 2024-08-27

## 2024-08-27 PROBLEM — G81.90 HEMIPARESIS: Status: RESOLVED | Noted: 2024-08-26 | Resolved: 2024-08-27

## 2024-08-27 LAB
ALBUMIN SERPL BCP-MCNC: 3.4 G/DL (ref 3.5–5.2)
ALP SERPL-CCNC: 70 U/L (ref 55–135)
ALT SERPL W/O P-5'-P-CCNC: 37 U/L (ref 10–44)
ANION GAP SERPL CALC-SCNC: 9 MMOL/L (ref 8–16)
APTT PPP: 28.7 SEC (ref 21–32)
AST SERPL-CCNC: 21 U/L (ref 10–40)
BASOPHILS # BLD AUTO: 0.09 K/UL (ref 0–0.2)
BASOPHILS NFR BLD: 1.6 % (ref 0–1.9)
BILIRUB SERPL-MCNC: 1.2 MG/DL (ref 0.1–1)
BUN SERPL-MCNC: 11 MG/DL (ref 6–20)
CALCIUM SERPL-MCNC: 9.7 MG/DL (ref 8.7–10.5)
CHLORIDE SERPL-SCNC: 103 MMOL/L (ref 95–110)
CO2 SERPL-SCNC: 25 MMOL/L (ref 23–29)
CREAT SERPL-MCNC: 1.2 MG/DL (ref 0.5–1.4)
DIFFERENTIAL METHOD BLD: ABNORMAL
EOSINOPHIL # BLD AUTO: 0.4 K/UL (ref 0–0.5)
EOSINOPHIL NFR BLD: 6.7 % (ref 0–8)
ERYTHROCYTE [DISTWIDTH] IN BLOOD BY AUTOMATED COUNT: 13.2 % (ref 11.5–14.5)
EST. GFR  (NO RACE VARIABLE): >60 ML/MIN/1.73 M^2
GLUCOSE SERPL-MCNC: 99 MG/DL (ref 70–110)
HCT VFR BLD AUTO: 39.2 % (ref 40–54)
HGB BLD-MCNC: 12.9 G/DL (ref 14–18)
IMM GRANULOCYTES # BLD AUTO: 0.02 K/UL (ref 0–0.04)
IMM GRANULOCYTES NFR BLD AUTO: 0.4 % (ref 0–0.5)
INR PPP: 1 (ref 0.8–1.2)
LYMPHOCYTES # BLD AUTO: 1.1 K/UL (ref 1–4.8)
LYMPHOCYTES NFR BLD: 18.9 % (ref 18–48)
MAGNESIUM SERPL-MCNC: 1.9 MG/DL (ref 1.6–2.6)
MCH RBC QN AUTO: 27.6 PG (ref 27–31)
MCHC RBC AUTO-ENTMCNC: 32.9 G/DL (ref 32–36)
MCV RBC AUTO: 84 FL (ref 82–98)
MONOCYTES # BLD AUTO: 0.8 K/UL (ref 0.3–1)
MONOCYTES NFR BLD: 14.4 % (ref 4–15)
NEUTROPHILS # BLD AUTO: 3.2 K/UL (ref 1.8–7.7)
NEUTROPHILS NFR BLD: 58 % (ref 38–73)
NRBC BLD-RTO: 0 /100 WBC
PHOSPHATE SERPL-MCNC: 3.3 MG/DL (ref 2.7–4.5)
PLATELET # BLD AUTO: 357 K/UL (ref 150–450)
PMV BLD AUTO: 9.1 FL (ref 9.2–12.9)
POTASSIUM SERPL-SCNC: 3.8 MMOL/L (ref 3.5–5.1)
PROT SERPL-MCNC: 7.7 G/DL (ref 6–8.4)
PROTHROMBIN TIME: 10.9 SEC (ref 9–12.5)
RBC # BLD AUTO: 4.67 M/UL (ref 4.6–6.2)
SODIUM SERPL-SCNC: 137 MMOL/L (ref 136–145)
WBC # BLD AUTO: 5.56 K/UL (ref 3.9–12.7)

## 2024-08-27 PROCEDURE — 25000003 PHARM REV CODE 250

## 2024-08-27 PROCEDURE — 85730 THROMBOPLASTIN TIME PARTIAL: CPT | Performed by: PSYCHIATRY & NEUROLOGY

## 2024-08-27 PROCEDURE — 80053 COMPREHEN METABOLIC PANEL: CPT | Performed by: NURSE PRACTITIONER

## 2024-08-27 PROCEDURE — 36415 COLL VENOUS BLD VENIPUNCTURE: CPT | Performed by: PSYCHIATRY & NEUROLOGY

## 2024-08-27 PROCEDURE — 85610 PROTHROMBIN TIME: CPT | Performed by: PSYCHIATRY & NEUROLOGY

## 2024-08-27 PROCEDURE — 84100 ASSAY OF PHOSPHORUS: CPT | Performed by: NURSE PRACTITIONER

## 2024-08-27 PROCEDURE — 25000003 PHARM REV CODE 250: Performed by: NURSE PRACTITIONER

## 2024-08-27 PROCEDURE — 85025 COMPLETE CBC W/AUTO DIFF WBC: CPT | Performed by: NURSE PRACTITIONER

## 2024-08-27 PROCEDURE — 99233 SBSQ HOSP IP/OBS HIGH 50: CPT | Mod: ,,, | Performed by: PSYCHIATRY & NEUROLOGY

## 2024-08-27 PROCEDURE — 83735 ASSAY OF MAGNESIUM: CPT | Performed by: NURSE PRACTITIONER

## 2024-08-27 RX ORDER — CLOPIDOGREL BISULFATE 75 MG/1
75 TABLET ORAL DAILY
Qty: 28 TABLET | Refills: 0 | Status: SHIPPED | OUTPATIENT
Start: 2024-08-28 | End: 2024-09-25

## 2024-08-27 RX ORDER — ATORVASTATIN CALCIUM 40 MG/1
40 TABLET, FILM COATED ORAL DAILY
Qty: 90 TABLET | Refills: 3 | Status: SHIPPED | OUTPATIENT
Start: 2024-08-28 | End: 2025-08-28

## 2024-08-27 RX ORDER — NAPROXEN SODIUM 220 MG/1
81 TABLET, FILM COATED ORAL DAILY
Qty: 30 TABLET | Refills: 11 | Status: SHIPPED | OUTPATIENT
Start: 2024-08-28 | End: 2025-08-28

## 2024-08-27 RX ADMIN — CLOPIDOGREL BISULFATE 75 MG: 75 TABLET ORAL at 08:08

## 2024-08-27 RX ADMIN — ATORVASTATIN CALCIUM 40 MG: 40 TABLET, FILM COATED ORAL at 08:08

## 2024-08-27 RX ADMIN — ASPIRIN 81 MG CHEWABLE TABLET 81 MG: 81 TABLET CHEWABLE at 08:08

## 2024-08-27 NOTE — ASSESSMENT & PLAN NOTE
Yannick Santiago is a 53 y.o. male with PMH of HTN and pre-DM that was admitted to [facility] for further evaluation and management of acute L MCA stroke. He initially presented with new onset RFD, RSW, and dysarthria that had resolved upon arrival to ED. Exam nonfocal with NIHSS 0. LKN approx 5 hrs PTA, therefore OOW for TNK. CTA negative for LVO or critical stenosis. MRI brain revealed acute L corona radiata infarct. TTE with EF 55-60%, no LAE/WMA, and + small PFO. Extremity US negative for peripheral clot source. Suspect etiology of stroke likely small vessel disease.    NAEON, neuro exam remains nonfocal. Stable for discharge home with outpatient follow up for further eval of PFO closure. Discussed plan for DAPT x 30 days for stroke prevention followed by ASA monotherapy.      Antithrombotics for secondary stroke prevention: Antiplatelets: Aspirin: 81 mg daily  Clopidogrel: 75 mg daily    Statins for secondary stroke prevention: Statins: Atorvastatin- 40 mg daily    Aggressive risk factor modification: HTN, HLD, Diet, Exercise, Obesity    Rehab efforts: The patient has been evaluated by a stroke team provider and the therapy needs have been fully considered based off the presenting complaints and exam findings. The following therapy evaluations are needed: None    Diagnostics ordered/pending: None     VTE prophylaxis: Heparin 5000 units SQ every 8 hours  Mechanical prophylaxis: Place SCDs    BP parameters: Infarct: No intervention, SBP <220
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
## 2024-08-27 NOTE — PROGRESS NOTES
[facility] - Neurosurgery ([facility])  Vascular Neurology  Comprehensive Stroke Center  Progress Note    Assessment/Plan:     * Cerebrovascular accident (CVA) due to thrombosis of left middle cerebral artery  Yannick Santiago is a 53 y.o. male with PMH of HTN and pre-DM that [place] [facility] [place] and management of acute L MCA stroke. He initially presented with new onset RFD, RSW, and dysarthria that had resolved upon arrival to ED. Exam nonfocal with NIHSS 0. LKN approx 5 hrs PTA, therefore OOW for TNK. CTA negative for LVO or critical stenosis. MRI brain revealed acute L corona radiata infarct. TTE with EF 55-60%, no LAE/WMA, and + small PFO. Extremity US negative for peripheral clot source. Suspect etiology of stroke likely small vessel disease.    NAEON, neuro exam remains nonfocal. Stable for discharge home with outpatient follow up for further eval of PFO closure. Discussed plan for DAPT x 30 days for stroke prevention followed by ASA monotherapy.      Antithrombotics for secondary stroke prevention: Antiplatelets: Aspirin: 81 mg daily  Clopidogrel: 75 mg daily    Statins for secondary stroke prevention: Statins: Atorvastatin- 40 mg daily    Aggressive risk factor modification: HTN, HLD, Diet, Exercise, Obesity    Rehab efforts: The patient has been evaluated by a stroke team provider and the therapy needs have been fully considered based off the presenting complaints and exam findings. The following therapy evaluations are needed: None    Diagnostics ordered/pending: None     VTE prophylaxis: Heparin 5000 units SQ every 8 hours  Mechanical prophylaxis: Place SCDs    BP parameters: Infarct: No intervention, SBP <220      Body mass index (BMI) of 32.0 to 32.9 in adult  -Stroke risk factor  - on diet and exercise    Parotid nodule  -As seen on CTA head and neck   -Outpatient f/u with ENT ordered at discharge    Prediabetes  -Stroke risk factor  -A1c 6.2  -Glucose goal while inpatient  140-180  -Outpatient follow up with PCP for ongoing managment    Hyperlipidemia, mixed  -Stroke risk factor  -  -Atorvastatin 40mg daily for LDL goal <70         8/27/2024 NAEON, neuro exam remains nonfocal. Echo with normal EF and evidence of small PFO. Extremity US negative. Stable for discharge home with outpatient follow up for further eval of PFO closure.    STROKE DOCUMENTATION   Acute Stroke Times   Last Known Normal Date: 08/25/24  Last Known Normal Time: 2100  Unknown Symptom Onset Date: Unknown Date  Unknown Symptom Onset Time: Unknown Time  Stroke Team Called Date: 08/26/24  Stroke Team Called Time: 0206  Stroke Team Arrival Date: 08/26/24  Stroke Team Arrival Time: 0212  Thrombolytic Therapy Recommended: No  CTA Interpretation Time: 0222  Thrombectomy Recommended: No    NIH Scale:  1a. Level of Consciousness: 0-->Alert, keenly responsive  1b. LOC Questions: 0-->Answers both questions correctly  1c. LOC Commands: 0-->Performs both tasks correctly  2. Best Gaze: 0-->Normal  3. Visual: 0-->No visual loss  4. Facial Palsy: 0-->Normal symmetrical movements  5a. Motor Arm, Left: 0-->No drift, limb holds 90 (or 45) degrees for full 10 secs  5b. Motor Arm, Right: 0-->No drift, limb holds 90 (or 45) degrees for full 10 secs  6a. Motor Leg, Left: 0-->No drift, leg holds 30 degree position for full 5 secs  6b. Motor Leg, Right: 0-->No drift, leg holds 30 degree position for full 5 secs  7. Limb Ataxia: 0-->Absent  8. Sensory: 0-->Normal, no sensory loss  9. Best Language: 0-->No aphasia, normal  10. Dysarthria: 0-->Normal  11. Extinction and Inattention (formerly Neglect): 0-->No abnormality  Total (NIH Stroke Scale): 0       Modified Gilpin Score: 0  [place] Coma Scale:    ABCD2 Score:    [id number] Score:   HAS -BLED Score:   ICH Score:   Hunt & Young Classification:      Hemorrhagic change of an Ischemic Stroke: Does this patient have an ischemic stroke with hemorrhagic changes? No     Neurologic Chief  Complaint: acute L MCA stroke    Subjective:     Interval History: Patient is seen for follow-up neurological assessment and treatment recommendations: NAEON, neuro exam remains nonfocal. Echo with normal EF and evidence of small PFO. Extremity US negative. Stable for discharge home with outpatient follow up for further eval of PFO closure.    HPI, Past Medical, Family, and Social History remains the same as documented in the initial encounter.     Review of Systems   Eyes:  Negative for visual disturbance.   Neurological:  Negative for facial asymmetry, speech difficulty, weakness and numbness.   All other systems reviewed and are negative.    Scheduled Meds:   aspirin  81 mg Oral Daily    atorvastatin  40 mg Oral Daily    clopidogreL  75 mg Oral Daily    enoxparin  40 mg Subcutaneous Daily     Continuous Infusions:  PRN Meds:  Current Facility-Administered Medications:     acetaminophen, 650 mg, Oral, Q6H PRN    bisacodyL, 10 mg, Rectal, Daily PRN    calcium carbonate, 500 mg, Oral, TID PRN    labetalol, 10 mg, Intravenous, Q6H PRN    ondansetron, 4 mg, Intravenous, Q12H PRN    sodium chloride 0.9%, 500 mL, Intravenous, PRN    sodium chloride 0.9%, 10 mL, Intravenous, PRN    Objective:     Vital Signs (Most Recent):  Temp: 98.2 °F (36.8 °C) (08/27/24 1059)  Pulse: 81 (08/27/24 1114)  Resp: 18 (08/27/24 1059)  BP: (!) 142/90 (08/27/24 1059)  SpO2: 100 % (08/27/24 1059)  BP Location: Right forearm    Vital Signs Range (Last 24H):  Temp:  [98 °F (36.7 °C)-98.7 °F (37.1 °C)]   Pulse:  [81-98]   Resp:  [16-18]   BP: (130-159)/(70-92)   SpO2:  [95 %-100 %]   BP Location: Right forearm       Physical Exam  Vitals and nursing note reviewed.   Constitutional:       General: He is not in acute distress.  HENT:      Head: Normocephalic.      Nose: Nose normal.   Eyes:      Extraocular Movements: Extraocular movements intact.      Conjunctiva/sclera: Conjunctivae normal.   Cardiovascular:      Rate and Rhythm: Normal rate.    Pulmonary:      Effort: Pulmonary effort is normal. No respiratory distress.   Skin:     General: Skin is warm.   Neurological:      Mental Status: He is alert.      Comments: See below for neuro exam   Psychiatric:         Behavior: Behavior normal. Behavior is cooperative.              Neurological Exam:   LOC: alert  Attention Span: Good   Language: No aphasia  Articulation: No dysarthria  Orientation: Person, Place, Time   Visual Fields: Full  EOM (CN III, IV, VI): Full/intact  Facial Sensation (CN V): Normal  Facial Movement (CN VII): Symmetric facial expression    Motor:   --LUE Normal 5/5  --LLE Normal 5/5  --RUE Normal 5/5  --RLE Normal 5/5  Sensation: Intact to light touch      Laboratory:  CMP:   Recent Labs   Lab 08/27/24 0521   CALCIUM 9.7   ALBUMIN 3.4*   PROT 7.7      K 3.8   CO2 25      BUN 11   CREATININE 1.2   ALKPHOS 70   ALT 37   AST 21   BILITOT 1.2*     CBC:   Recent Labs   Lab 08/27/24 0521   WBC 5.56   RBC 4.67   HGB 12.9*   HCT 39.2*      MCV 84   MCH 27.6   MCHC 32.9     Lipid Panel:   Recent Labs   Lab 08/26/24 0229   CHOL 185   LDLCALC 122.6   HDL 26*   TRIG 182*     Coagulation:   Recent Labs   Lab 08/27/24 0521   INR 1.0   APTT 28.7     Hgb A1C:   Recent Labs   Lab 08/26/24 0229   HGBA1C 6.2*     TSH:   Recent Labs   Lab 08/26/24 0229   TSH 2.025       Diagnostic Results     Brain Imaging   MRI brain without contrast 8/26/2024  Impression:  Small focus of restricted diffusion within the posterior limb of the left internal capsule concerning for small focus of acute ischemia.  No evidence of associated hemorrhage or hydrocephalus.  Paranasal sinus disease.      Vessel Imaging   CTA stroke multiphase 8/26/2024  Impression:  CTA Stroke Multiphase:  No acute intracranial hemorrhage, intracranial mass effect, or discrete large vascular territory brain infarct.  No major vessel high-grade stenosis or occlusion in the craniocervical cerebrovascular arterial  circulation.  Incidental 11 x 7 mm right intra-parotid lymph node, versus a small intraparotid tumor.  Recommendations include clinical correlation and outpatient ENT follow-up.  Paranasal sinus disease, as detailed in the body of the report.       Cardiac Imaging   TTE 8/26/2024    Left Ventricle: The left ventricle is normal in size. Ventricular mass is normal. Normal wall thickness. There is normal systolic function with a visually estimated ejection fraction of 55 - 60%. Biplane (2D) method of discs ejection fraction is 57%. There is normal diastolic function.    Right Ventricle: Normal right ventricular cavity size. Wall thickness is normal. Systolic function is normal.    Left Atrium: Agitated saline study of the atrial septum is mildly positive (<10 bubbles appearing within 5 beats of appearance in the right atrium) indicating intracardiac shunt at atrial level.    Tricuspid Valve: There is mild regurgitation.    Aorta: Aortic root is normal in size measuring 2.94 cm. Ascending aorta is normal measuring 3.08 cm.    Pulmonary Artery: The estimated pulmonary artery systolic pressure is 19 mmHg.    IVC/SVC: Normal venous pressure at 3 mmHg.    Shelia Rolon PA-C  Comprehensive Stroke Center  Department of Vascular Neurology   [facility] Neurosurgery [facility])

## 2024-08-27 NOTE — PLAN OF CARE
[facility] Are[facility]o - Neurosurgery (Hospital)  Discharge Final Note    Primary Care Provider: No, Primary Doctor    Expected Discharge Date: 8/27/2024    Final Discharge Note (most recent)       Final Note - 08/27/24 1337          Final Note    Assessment Type Final Discharge Note (P)      Anticipated Discharge Disposition Home or Self Care (P)         Post-Acute Status    Post-Acute Authorization Other (P)      Other Status No Post-Acute Service Needs (P)                  Discharge home with family.  No post acute needs.      Vascular Neurology clinic will contact patient to schedule follow-up appt.      Yesy Manzanares LMSW  [facility]  [phone]    Future Appointments   Date Time Provider Department Center   9/3/2024  9:00 AM Rodriguez Ritchie II, MD [facility]            Contact Info       MD [facility]  [address]  [facility] [id number] [phone]       Next Steps: Follow up on 9/3/2024    Instructions: Hospital Follow Up 9:00 AM

## 2024-08-27 NOTE — ASSESSMENT & PLAN NOTE
-Stroke risk factor  -A1c 6.2  -Glucose goal while inpatient 140-180  -Outpatient follow up with PCP for ongoing managment

## 2024-08-27 NOTE — SUBJECTIVE & OBJECTIVE
Neurologic Chief Complaint: acute L MCA stroke    Subjective:     Interval History: Patient is seen for follow-up neurological assessment and treatment recommendations: NAEON, neuro exam remains nonfocal. Echo with normal EF and evidence of small PFO. Extremity US negative. Stable for discharge home with outpatient follow up for further eval of PFO closure.    HPI, Past Medical, Family, and Social History remains the same as documented in the initial encounter.     Review of Systems   Eyes:  Negative for visual disturbance.   Neurological:  Negative for facial asymmetry, speech difficulty, weakness and numbness.   All other systems reviewed and are negative.    Scheduled Meds:   aspirin  81 mg Oral Daily    atorvastatin  40 mg Oral Daily    clopidogreL  75 mg Oral Daily    enoxparin  40 mg Subcutaneous Daily     Continuous Infusions:  PRN Meds:  Current Facility-Administered Medications:     acetaminophen, 650 mg, Oral, Q6H PRN    bisacodyL, 10 mg, Rectal, Daily PRN    calcium carbonate, 500 mg, Oral, TID PRN    labetalol, 10 mg, Intravenous, Q6H PRN    ondansetron, 4 mg, Intravenous, Q12H PRN    sodium chloride 0.9%, 500 mL, Intravenous, PRN    sodium chloride 0.9%, 10 mL, Intravenous, PRN    Objective:     Vital Signs (Most Recent):  Temp: 98.2 °F (36.8 °C) (08/27/24 1059)  Pulse: 81 (08/27/24 1114)  Resp: 18 (08/27/24 1059)  BP: (!) 142/90 (08/27/24 1059)  SpO2: 100 % (08/27/24 1059)  BP Location: Right forearm    Vital Signs Range (Last 24H):  Temp:  [98 °F (36.7 °C)-98.7 °F (37.1 °C)]   Pulse:  [81-98]   Resp:  [16-18]   BP: (130-159)/(70-92)   SpO2:  [95 %-100 %]   BP Location: Right forearm       Physical Exam  Vitals and nursing note reviewed.   Constitutional:       General: He is not in acute distress.  HENT:      Head: Normocephalic.      Nose: Nose normal.   Eyes:      Extraocular Movements: Extraocular movements intact.      Conjunctiva/sclera: Conjunctivae normal.   Cardiovascular:      Rate and  Rhythm: Normal rate.   Pulmonary:      Effort: Pulmonary effort is normal. No respiratory distress.   Skin:     General: Skin is warm.   Neurological:      Mental Status: He is alert.      Comments: See below for neuro exam   Psychiatric:         Behavior: Behavior normal. Behavior is cooperative.              Neurological Exam:   LOC: alert  Attention Span: Good   Language: No aphasia  Articulation: No dysarthria  Orientation: Person, Place, Time   Visual Fields: Full  EOM (CN III, IV, VI): Full/intact  Facial Sensation (CN V): Normal  Facial Movement (CN VII): Symmetric facial expression    Motor:   --LUE Normal 5/5  --LLE Normal 5/5  --RUE Normal 5/5  --RLE Normal 5/5  Sensation: Intact to light touch      Laboratory:  CMP:   Recent Labs   Lab 08/27/24 0521   CALCIUM 9.7   ALBUMIN 3.4*   PROT 7.7      K 3.8   CO2 25      BUN 11   CREATININE 1.2   ALKPHOS 70   ALT 37   AST 21   BILITOT 1.2*     CBC:   Recent Labs   Lab 08/27/24 0521   WBC 5.56   RBC 4.67   HGB 12.9*   HCT 39.2*      MCV 84   MCH 27.6   MCHC 32.9     Lipid Panel:   Recent Labs   Lab 08/26/24 0229   CHOL 185   LDLCALC 122.6   HDL 26*   TRIG 182*     Coagulation:   Recent Labs   Lab 08/27/24 0521   INR 1.0   APTT 28.7     Hgb A1C:   Recent Labs   Lab 08/26/24 0229   HGBA1C 6.2*     TSH:   Recent Labs   Lab 08/26/24 0229   TSH 2.025       Diagnostic Results     Brain Imaging   MRI brain without contrast 8/26/2024  Impression:  Small focus of restricted diffusion within the posterior limb of the left internal capsule concerning for small focus of acute ischemia.  No evidence of associated hemorrhage or hydrocephalus.  Paranasal sinus disease.      Vessel Imaging   CTA stroke multiphase 8/26/2024  Impression:  CTA Stroke Multiphase:  No acute intracranial hemorrhage, intracranial mass effect, or discrete large vascular territory brain infarct.  No major vessel high-grade stenosis or occlusion in the craniocervical  cerebrovascular arterial circulation.  Incidental 11 x 7 mm right intra-parotid lymph node, versus a small intraparotid tumor.  Recommendations include clinical correlation and outpatient ENT follow-up.  Paranasal sinus disease, as detailed in the body of the report.       Cardiac Imaging   TTE 8/26/2024    Left Ventricle: The left ventricle is normal in size. Ventricular mass is normal. Normal wall thickness. There is normal systolic function with a visually estimated ejection fraction of 55 - 60%. Biplane (2D) method of discs ejection fraction is 57%. There is normal diastolic function.    Right Ventricle: Normal right ventricular cavity size. Wall thickness is normal. Systolic function is normal.    Left Atrium: Agitated saline study of the atrial septum is mildly positive (<10 bubbles appearing within 5 beats of appearance in the right atrium) indicating intracardiac shunt at atrial level.    Tricuspid Valve: There is mild regurgitation.    Aorta: Aortic root is normal in size measuring 2.94 cm. Ascending aorta is normal measuring 3.08 cm.    Pulmonary Artery: The estimated pulmonary artery systolic pressure is 19 mmHg.    IVC/SVC: Normal venous pressure at 3 mmHg.

## 2024-08-27 NOTE — DISCHARGE SUMMARY
Ciaran Arevalo - Neurosurgery ([facility])  Vascular Neurology  Comprehensive Stroke Center  Discharge Summary     Summary:     Admit Date: 8/26/2024  2:06 AM    Discharge Date and Time: 8/27/2024  5:54 PM    Attending Physician: Mauricio Flores MD     Discharge Provider: Shelia Rolon PA-C    History of Present Illness:   52 y/o male who went to sleep at 2100 and woke up at 0130 with right sided facial droop, dysarthria and right sided weakness.  Patient brought in by EMS.  Stroke code activated  Upon my examination patient symptoms have resolved NIHSS 0  Initial /83  CTA head and neck multiphase with no LVO or acute process seen. No thrombolytics as out of window and resolved symptoms.   Patient has not seen a MD in years and is not on any meds.  MRI brain w/o contrast      Hospital Course (synopsis of major diagnoses, care, treatment, and services provided during the course of the hospital stay):   8/27/2024 NAEON, neuro exam remains nonfocal. Echo with normal EF and evidence of small PFO. Extremity US negative. Stable for discharge home with outpatient follow up for further eval of PFO closure.    Goals of Care Treatment Preferences:  Code Status: Full Code      Stroke Etiology: Ischemic Small Vessel Disease ([place])    STROKE DOCUMENTATION   Acute Stroke Times   Last Known Normal Date: 08/25/24  Last Known Normal Time: 2100  Unknown Symptom Onset Date: Unknown Date  Unknown Symptom Onset Time: Unknown Time  Stroke Team Called Date: 08/26/24  Stroke Team Called Time: 0206  Stroke Team Arrival Date: 08/26/24  Stroke Team Arrival Time: 0212  Thrombolytic Therapy Recommended: No  CTA Interpretation Time: 0222  Thrombectomy Recommended: No     NIH Scale:  1a. Level of Consciousness: 0-->Alert, keenly responsive  1b. LOC Questions: 0-->Answers both questions correctly  1c. LOC Commands: 0-->Performs both tasks correctly  2. Best Gaze: 0-->Normal  3. Visual: 0-->No visual loss  4. Facial Palsy: 0-->Normal  symmetrical movements  5a. Motor Arm, Left: 0-->No drift, limb holds 90 (or 45) degrees for full 10 secs  5b. Motor Arm, Right: 0-->No drift, limb holds 90 (or 45) degrees for full 10 secs  6a. Motor Leg, Left: 0-->No drift, leg holds 30 degree position for full 5 secs  6b. Motor Leg, Right: 0-->No drift, leg holds 30 degree position for full 5 secs  7. Limb Ataxia: 0-->Absent  8. Sensory: 0-->Normal, no sensory loss  9. Best Language: 0-->No aphasia, normal  10. Dysarthria: 0-->Normal  11. Extinction and Inattention (formerly Neglect): 0-->No abnormality  Total (NIH Stroke Scale): 0        Modified Moro Score: 0  [place]isa Coma Scale:    ABCD2 Score:    [id number] Score:   HAS -BLED Score:   ICH Score:   Hunt & Young Classification:       Assessment/Plan:     Diagnostic Results      Brain Imaging   MRI brain without contrast 8/26/2024  Impression:  Small focus of restricted diffusion within the posterior limb of the left internal capsule concerning for small focus of acute ischemia.  No evidence of associated hemorrhage or hydrocephalus.  Paranasal sinus disease.        Vessel Imaging   CTA stroke multiphase 8/26/2024  Impression:  CTA Stroke Multiphase:  No acute intracranial hemorrhage, intracranial mass effect, or discrete large vascular territory brain infarct.  No major vessel high-grade stenosis or occlusion in the craniocervical cerebrovascular arterial circulation.  Incidental 11 x 7 mm right intra-parotid lymph node, versus a small intraparotid tumor.  Recommendations include clinical correlation and outpatient ENT follow-up.  Paranasal sinus disease, as detailed in the body of the report.        Cardiac Imaging   TTE 8/26/2024    Left Ventricle: The left ventricle is normal in size. Ventricular mass is normal. Normal wall thickness. There is normal systolic function with a visually estimated ejection fraction of 55 - 60%. Biplane (2D) method of discs ejection fraction is 57%. There is normal diastolic  function.    Right Ventricle: Normal right ventricular cavity size. Wall thickness is normal. Systolic function is normal.    Left Atrium: Agitated saline study of the atrial septum is mildly positive (<10 bubbles appearing within 5 beats of appearance in the right atrium) indicating intracardiac shunt at atrial level.    Tricuspid Valve: There is mild regurgitation.    Aorta: Aortic root is normal in size measuring 2.94 cm. Ascending aorta is normal measuring 3.08 cm.    Pulmonary Artery: The estimated pulmonary artery systolic pressure is 19 mmHg.    IVC/SVC: Normal venous pressure at 3 mmHg.      Interventions: None    Complications: None    Disposition: Home or Self Care    Final Active Diagnoses:    Diagnosis Date Noted POA    PRINCIPAL PROBLEM:  Cerebrovascular accident (CVA) due to thrombosis of left middle cerebral artery [I63.312] 08/26/2024 Yes    Hyperlipidemia, mixed [E78.2] 08/26/2024 Yes    Prediabetes [R73.03] 08/26/2024 Yes    Parotid nodule [K11.8] 08/26/2024 Yes    Body mass index (BMI) of 32.0 to 32.9 in adult [Z68.32] 08/26/2024 Not Applicable      Problems Resolved During this Admission:    Diagnosis Date Noted Date Resolved POA    Hemiparesis [G81.90] 08/26/2024 08/27/2024 Yes    Hypokalemia [E87.6] 08/26/2024 08/27/2024 Yes     No new Assessment & Plan notes have been filed under this hospital service since the last note was generated.  Service: Vascular Neurology      Recommendations:     Post-discharge complication risks: Falls    Stroke Education given to: patient    Follow-up in Stroke Clinic in 4-6 weeks.     Discharge Plan:  Antithrombotics: Aspirin 81mg daily indefinitely, Clopidogrel 75mg daily x 30 days  Statin: Atorvastatin 40mg  Aggresive risk factor modification:  Hypertension  Diet  Exercise  Obesity    Follow Up:   Follow-up Information       No, Primary Doctor Follow up.               Rodriguez Ritchie Ii, MD Follow up on 9/3/2024.    Why: Hospital Follow Up 9:00 AM  Contact  information:  [facility]  [address]  [facility] [id number] [phone]                           Patient Instructions:      Ambulatory referral/consult to ENT   Standing Status: Future   Referral Priority: Routine Referral Type: Consultation   Referral Reason: Specialty Services Required   Requested Specialty: Otolaryngology   Number of Visits Requested: 1     Ambulatory referral/consult to Interventional Cardiology   Standing Status: Future   Referral Priority: Routine Referral Type: Consultation   Referral Reason: Specialty Services Required   Requested Specialty: Interventional Cardiology   Number of Visits Requested: 1     Ambulatory referral/consult to Vascular Neurology   Standing Status: Future   Referral Priority: Routine Referral Type: Consultation   Referral Reason: Specialty Services Required   Requested Specialty: Vascular Neurology   Number of Visits Requested: 1     Diet Cardiac   Order Comments: See Stroke Patient Education Guide Booklet for details.     Call 911 for any of the following:   Order Comments: Call 911  right away if any of the following warning signs come on suddenly, even if the symptoms only last for a few minutes. With stroke, timing is very important.   - Warning Signs of Stroke:  - Weakness: You may feel a sudden weakness, tingling or loss of feeling on one side of your face or body.  - Vision Problems: You may have sudden double vision or trouble seeing in one or both eyes.  - Speech Problems: You may have sudden trouble talking, slured speech, or problems understanding others.  - Headache: You may have sudden, severe headache.  - Movement Problems: You may experience dizziness, a feeling of spinning, a loss of balance, a feeling of falling or blackouts.     Activity as tolerated       Medications:  Reconciled Home Medications:      Medication List        START taking these medications      aspirin 81 MG Chew  Take 1 tablet (81 mg total) by  mouth once daily.  Start taking on: August 28, 2024     atorvastatin 40 MG tablet  Commonly known as: LIPITOR  Take 1 tablet (40 mg total) by mouth once daily.  Start taking on: August 28, 2024     clopidogreL 75 mg tablet  Commonly known as: PLAVIX  Take 1 tablet (75 mg total) by mouth once daily.  Start taking on: August 28, 2024              Shelia Rolon PA-C  [facility] Stroke [facility]  Department of Vascular Neurology   [facility] Neurosurgery [facility]

## 2024-08-27 NOTE — HOSPITAL COURSE
8/27/2024 NAEON, neuro exam remains nonfocal. Echo with normal EF and evidence of small PFO. Extremity US negative. Stable for discharge home with outpatient follow up for further eval of PFO closure.

## 2024-08-29 ENCOUNTER — HOME CARE VISIT (OUTPATIENT)
Dept: NEUROLOGY | Facility: HOSPITAL | Age: 54
End: 2024-08-29
Payer: COMMERCIAL

## 2024-08-29 LAB
OHS QRS DURATION: 100 MS
OHS QTC CALCULATION: 440 MS

## 2024-08-29 NOTE — PROGRESS NOTES
Stopped by patient house knocked on door and no one home.  Dropped off Business Card / Brochure for follow up call.

## 2024-09-03 ENCOUNTER — OFFICE VISIT (OUTPATIENT)
Dept: OTOLARYNGOLOGY | Facility: CLINIC | Age: 54
End: 2024-09-03
Payer: COMMERCIAL

## 2024-09-03 ENCOUNTER — LAB VISIT (OUTPATIENT)
Dept: LAB | Facility: HOSPITAL | Age: 54
End: 2024-09-03
Attending: INTERNAL MEDICINE
Payer: COMMERCIAL

## 2024-09-03 ENCOUNTER — OFFICE VISIT (OUTPATIENT)
Dept: INTERNAL MEDICINE | Facility: CLINIC | Age: 54
End: 2024-09-03
Payer: COMMERCIAL

## 2024-09-03 VITALS
WEIGHT: 201.75 LBS | HEART RATE: 90 BPM | HEIGHT: 67 IN | SYSTOLIC BLOOD PRESSURE: 130 MMHG | OXYGEN SATURATION: 98 % | DIASTOLIC BLOOD PRESSURE: 70 MMHG | BODY MASS INDEX: 31.66 KG/M2

## 2024-09-03 VITALS
SYSTOLIC BLOOD PRESSURE: 127 MMHG | WEIGHT: 197.63 LBS | DIASTOLIC BLOOD PRESSURE: 81 MMHG | HEART RATE: 94 BPM | BODY MASS INDEX: 30.96 KG/M2

## 2024-09-03 DIAGNOSIS — Z11.59 SCREENING FOR VIRAL DISEASE: ICD-10-CM

## 2024-09-03 DIAGNOSIS — K11.8 PAROTID NODULE: Primary | ICD-10-CM

## 2024-09-03 DIAGNOSIS — Z12.11 SCREEN FOR COLON CANCER: ICD-10-CM

## 2024-09-03 DIAGNOSIS — I63.312 CEREBROVASCULAR ACCIDENT (CVA) DUE TO THROMBOSIS OF LEFT MIDDLE CEREBRAL ARTERY: Primary | ICD-10-CM

## 2024-09-03 DIAGNOSIS — E78.2 HYPERLIPIDEMIA, MIXED: ICD-10-CM

## 2024-09-03 DIAGNOSIS — R73.03 PREDIABETES: ICD-10-CM

## 2024-09-03 LAB
HAV IGM SERPL QL IA: NORMAL
HBV CORE IGM SERPL QL IA: NORMAL
HBV SURFACE AG SERPL QL IA: NORMAL
HCV AB SERPL QL IA: NORMAL
HIV 1+2 AB+HIV1 P24 AG SERPL QL IA: NORMAL

## 2024-09-03 PROCEDURE — 1160F RVW MEDS BY RX/DR IN RCRD: CPT | Mod: CPTII,S$GLB,, | Performed by: INTERNAL MEDICINE

## 2024-09-03 PROCEDURE — 99214 OFFICE O/P EST MOD 30 MIN: CPT | Mod: S$GLB,,, | Performed by: INTERNAL MEDICINE

## 2024-09-03 PROCEDURE — 80074 ACUTE HEPATITIS PANEL: CPT | Performed by: INTERNAL MEDICINE

## 2024-09-03 PROCEDURE — 3079F DIAST BP 80-89 MM HG: CPT | Mod: CPTII,S$GLB,, | Performed by: PHYSICIAN ASSISTANT

## 2024-09-03 PROCEDURE — 87389 HIV-1 AG W/HIV-1&-2 AB AG IA: CPT | Performed by: INTERNAL MEDICINE

## 2024-09-03 PROCEDURE — 3044F HG A1C LEVEL LT 7.0%: CPT | Mod: CPTII,S$GLB,, | Performed by: PHYSICIAN ASSISTANT

## 2024-09-03 PROCEDURE — 99999 PR PBB SHADOW E&M-EST. PATIENT-LVL IV: CPT | Mod: PBBFAC,,, | Performed by: INTERNAL MEDICINE

## 2024-09-03 PROCEDURE — 3078F DIAST BP <80 MM HG: CPT | Mod: CPTII,S$GLB,, | Performed by: INTERNAL MEDICINE

## 2024-09-03 PROCEDURE — 3044F HG A1C LEVEL LT 7.0%: CPT | Mod: CPTII,S$GLB,, | Performed by: INTERNAL MEDICINE

## 2024-09-03 PROCEDURE — 3075F SYST BP GE 130 - 139MM HG: CPT | Mod: CPTII,S$GLB,, | Performed by: INTERNAL MEDICINE

## 2024-09-03 PROCEDURE — 99204 OFFICE O/P NEW MOD 45 MIN: CPT | Mod: S$GLB,,, | Performed by: PHYSICIAN ASSISTANT

## 2024-09-03 PROCEDURE — 99999 PR PBB SHADOW E&M-EST. PATIENT-LVL III: CPT | Mod: PBBFAC,,, | Performed by: PHYSICIAN ASSISTANT

## 2024-09-03 PROCEDURE — 3074F SYST BP LT 130 MM HG: CPT | Mod: CPTII,S$GLB,, | Performed by: PHYSICIAN ASSISTANT

## 2024-09-03 PROCEDURE — 3008F BODY MASS INDEX DOCD: CPT | Mod: CPTII,S$GLB,, | Performed by: PHYSICIAN ASSISTANT

## 2024-09-03 PROCEDURE — 3008F BODY MASS INDEX DOCD: CPT | Mod: CPTII,S$GLB,, | Performed by: INTERNAL MEDICINE

## 2024-09-03 PROCEDURE — 1159F MED LIST DOCD IN RCRD: CPT | Mod: CPTII,S$GLB,, | Performed by: INTERNAL MEDICINE

## 2024-09-03 PROCEDURE — 1111F DSCHRG MED/CURRENT MED MERGE: CPT | Mod: CPTII,S$GLB,, | Performed by: PHYSICIAN ASSISTANT

## 2024-09-03 PROCEDURE — 36415 COLL VENOUS BLD VENIPUNCTURE: CPT | Performed by: INTERNAL MEDICINE

## 2024-09-03 PROCEDURE — 1160F RVW MEDS BY RX/DR IN RCRD: CPT | Mod: CPTII,S$GLB,, | Performed by: PHYSICIAN ASSISTANT

## 2024-09-03 PROCEDURE — 1111F DSCHRG MED/CURRENT MED MERGE: CPT | Mod: CPTII,S$GLB,, | Performed by: INTERNAL MEDICINE

## 2024-09-03 PROCEDURE — 1159F MED LIST DOCD IN RCRD: CPT | Mod: CPTII,S$GLB,, | Performed by: PHYSICIAN ASSISTANT

## 2024-09-03 NOTE — PROGRESS NOTES
Subjective:       Patient ID: Yannick Santiago is a 53 y.o. male.    Chief Complaint:   Hospital Follow Up    HPI - he presented to the emergency department on August 26th with right facial droop, dysarthria, and right-sided weakness.  Symptoms resolved in the emergency room.  Imaging showed a small infarct.  He was discharged with Plavix, aspirin and atorvastatin.  He is here for follow-up.  He sees Neurology later today.  He has stopped smoking cigars.  He has never smoked cigarettes.  Labs showed prediabetes in the emergency room.  He does not have a primary care physician, and he needs a lot of health maintenance.  He agreed to lab work and colon cancer screening today.  He works at [employer] in [place], commuting 2 hours each way.    PMH:  TIA Aug 2024  HLP  Prediabetes    Meds:  Reviewed and reconciled in EPIC with patient during visit today.    Review of Systems   Constitutional:  Negative for fever.   HENT:  Negative for congestion.    Respiratory:  Negative for shortness of breath.    Cardiovascular:  Negative for chest pain.   Gastrointestinal:  Negative for abdominal pain.   Genitourinary:  Negative for difficulty urinating.   Musculoskeletal:  Negative for arthralgias.   Skin:  Negative for rash.   Neurological:  Negative for headaches.   Psychiatric/Behavioral:  Negative for sleep disturbance.        Objective:      Physical Exam  Constitutional:       General: He is not in acute distress.     Appearance: He is well-developed. He is not diaphoretic.      Comments: Friendly, well-appearing man   HENT:      Head: Normocephalic and atraumatic.   Cardiovascular:      Rate and Rhythm: Normal rate and regular rhythm.      Heart sounds: Normal heart sounds. No murmur heard.     No friction rub. No gallop.   Pulmonary:      Effort: No respiratory distress.      Breath sounds: No wheezing or rales.   Chest:      Chest wall: No tenderness.   Skin:     General: Skin is warm.      Findings: No erythema.    Neurological:      Mental Status: He is alert and oriented to person, place, and time.   Psychiatric:         Thought Content: Thought content normal.         Assessment:       1. Cerebrovascular accident (CVA) due to thrombosis of left middle cerebral artery    2. Hyperlipidemia, mixed    3. Prediabetes    4. Screening for viral disease    5. Screen for colon cancer        Plan:       Yannick was seen today for hospital follow up.    Diagnoses and all orders for this visit:    Cerebrovascular accident (CVA) due to thrombosis of left middle cerebral artery - he remains asymptomatic after the event 10 days ago.  He needs to stay on the atorvastatin and aspirin long term.  Await Neurology advice regarding Plavix.  From my point of view, he is ready to return to work.  Await Neurology opinion on this.    Hyperlipidemia, mixed - stable on a statin.  Monitor    Prediabetes - stable.  Does not require meds.  Monitor    Screening for viral disease - overdue for screening.  Labs ordered today  -     HIV 1/2 Ag/Ab (4th Gen); Future  -     HEPATITIS PANEL, ACUTE; Future    Screen for colon cancer - overdue for screening.  Colonoscopy ordered today  -     Ambulatory referral/consult to Endo Procedure ; Future    RTC 3 months    G Walt Ritchie MD MPH  Staff Internist

## 2024-09-03 NOTE — PROGRESS NOTES
Chief Complaint   Patient presents with    Parotid nodule appt.        53 y.o. male presents for hospital follow up.  There was an incidental finding of a R parotid gland lesion on CTA for stroke work up. He denies any facial swelling or pain. Denies fever, chills, dysphagia, voice change, sore throat, headache, URI symptoms. No trauma.     Denies any PSHx of the head/neck.   No history of radiation therapy.   History of cigar smoking - states he quit since hospital admission.       Chart Review  Recent admission 8/26-8/27 for CVA.   CTA head and neck done for stroke workup showed:  Soft tissues: An 11 x 7 mm oval nodular opacity in the posterosuperior aspect of the superficial lobe of the right parotid gland is favored to represent an intraparotid lymph node; however, a intraparotid tumor is not fully excluded.     Review of Systems     Constitutional: Negative for fatigue and unexpected weight change.   HENT: Per HPI  Eyes: Negative for visual disturbance.   Respiratory: Negative for shortness of breath, hemoptysis  Cardiovascular: Negative for chest pain and palpitations.   Musculoskeletal: Negative for decreased ROM, back pain.   Skin: Negative for rash.  Neurological: Negative for dizziness.      No past medical history on file.    No past surgical history on file.    family history is not on file.    Pt  reports that he has quit smoking. His smoking use included cigars. He does not have any smokeless tobacco history on file. He reports that he does not drink alcohol and does not use drugs.    Review of patient's allergies indicates:  No Known Allergies     Physical Exam    Vitals:    09/03/24 1452   BP: 127/81   Pulse: 94     Body mass index is 30.96 kg/m².    General: AOx3, NAD  Right Ear: External Auditory Canal WNL,TM w/o masses/lesions/perforations  Left Ear:  External Auditory Canal WNL,TM w/o masses/lesions/perforations  Nose: No gross nasal septal deviation.  Inferior Turbinates WNL bilaterally.  No  septal perforation.  No masses/lesions.  Oral Cavity: FOM Soft, no masses palpated.  Oral Tongue mobile.  Hard Palate WNL.  Oropharynx: BOT WNL.  No masses/lesions noted.  Tonsillar fossa without lesions.  Soft palate without masses.  Midline uvula.  Neck: No palpable lymphadenopathy at I - VI.    Face: House Brackmann I bilaterally. No palpable lesions to the parotid glands.   Eyes: Normal extra ocular motion bilaterally.      Assessment     1. Parotid nodule          Plan    Problem List Items Addressed This Visit          ENT    Parotid nodule - Primary    Relevant Orders    US Soft Tissue Head Neck     Incidental finding on CTA for stroke work up. Will get ultrasound for further evaluation. May need FNA.  Discussed with and images reviewed by Dr Venegas.

## 2024-09-04 ENCOUNTER — CLINICAL SUPPORT (OUTPATIENT)
Dept: ENDOSCOPY | Facility: HOSPITAL | Age: 54
End: 2024-09-04
Attending: INTERNAL MEDICINE
Payer: COMMERCIAL

## 2024-09-04 ENCOUNTER — TELEPHONE (OUTPATIENT)
Dept: ENDOSCOPY | Facility: HOSPITAL | Age: 54
End: 2024-09-04

## 2024-09-04 DIAGNOSIS — Z12.11 SCREEN FOR COLON CANCER: ICD-10-CM

## 2024-09-04 RX ORDER — SODIUM, POTASSIUM,MAG SULFATES 17.5-3.13G
1 SOLUTION, RECONSTITUTED, ORAL ORAL DAILY
Qty: 1 KIT | Refills: 0 | Status: SHIPPED | OUTPATIENT
Start: 2024-09-04 | End: 2024-09-06

## 2024-09-04 NOTE — TELEPHONE ENCOUNTER
Spoke to patient to schedule procedure(s) Colonoscopy       Physician to perform procedure(s) Dr. MARCUS Stewart  Date of Procedure (s) 11/15/24  Arrival Time 12:00 PM  Time of Procedure(s) 1:00 PM   Location of Procedure(s) [facility] 4th Floor  Type of Rx Prep sent to patient: Suprep  Instructions provided to patient via [facility]    Patient was informed on the following information and verbalized understanding. Screening questionnaire reviewed with patient and complete. If procedure requires anesthesia, a responsible adult needs to be present to accompany the patient home, patient cannot drive after receiving anesthesia. Appointment details are tentative, especially check-in time. Patient will receive a prep-op call 7 days prior to confirm check-in time for procedure. If applicable the patient should contact their pharmacy to verify Rx for procedure prep is ready for pick-up. Patient was advised to call the scheduling department at [phone] if pharmacy states no Rx is available. Patient was advised to call the endoscopy scheduling department if any questions or concerns arise.      [facility] Scheduling Department        The patient is currently under an internal PCP Dr LAWSON thomas and requires a blood thinner Plavix (clopidogrel) for their upcoming scheduled Colonoscopy on 11/15/24.

## 2024-09-06 ENCOUNTER — HOSPITAL ENCOUNTER (OUTPATIENT)
Dept: RADIOLOGY | Facility: HOSPITAL | Age: 54
Discharge: HOME | End: 2024-09-06
Attending: PHYSICIAN ASSISTANT
Payer: COMMERCIAL

## 2024-09-06 DIAGNOSIS — K11.8 PAROTID NODULE: ICD-10-CM

## 2024-09-06 PROCEDURE — 76536 US EXAM OF HEAD AND NECK: CPT | Mod: TC

## 2024-09-06 PROCEDURE — 76536 US EXAM OF HEAD AND NECK: CPT | Mod: 26,,, | Performed by: RADIOLOGY

## 2024-09-09 ENCOUNTER — TELEPHONE (OUTPATIENT)
Dept: NEUROLOGY | Facility: HOSPITAL | Age: 54
End: 2024-09-09
Payer: COMMERCIAL

## 2024-09-09 NOTE — TELEPHONE ENCOUNTER
RN spoke with patient following discharge from hospital. Risk factors for stroke discussed with patient and warning signs for stroke also discussed with patient; teach back method utilized for both pieces of education. Patient verbalized understanding of discharge instructions and medications. Follow up appointment date and time confirmed with patient after rescheduling to a new date with a better time for patient.    Patient instructed to seek medical help via 911 if new symptoms occur; patient verbalized understanding of this instruction. Patient relayed no new questions or comments at this time and thanked RN for her help.

## 2024-10-01 ENCOUNTER — TELEPHONE (OUTPATIENT)
Dept: ENDOSCOPY | Facility: HOSPITAL | Age: 54
End: 2024-10-01
Payer: COMMERCIAL

## 2024-10-01 NOTE — TELEPHONE ENCOUNTER
Dear Dr Vallejo,    Patient has a scheduled procedure Colonoscopy on 11/15/24 and is currently taking a blood thinner. In order to ensure patient safety, we would like to confirm that the patient can place their blood thinner medication on hold for the procedure. Can he/she discontinue Plavix (clopidogrel) for a minimum of 5 days prior to the procedure?     Thank you for your prompt reply.    [facility] Endoscopy Scheduling

## 2024-10-01 NOTE — TELEPHONE ENCOUNTER
----- Message from SABINA Doherty sent at 2024  2:09 PM CDT -----  Regardin/15/24     BT  The patient is currently under an internal PCP Dr LAWSON thomas and requires a blood thinner Plavix (clopidogrel) for their upcoming scheduled Colonoscopy on 11/15/24.

## 2024-10-07 NOTE — TELEPHONE ENCOUNTER
Please clarify:    Since it is past the 30 days (dx on 8/26/24) is he still on the Plavix or has it been discontinued?

## 2024-10-08 ENCOUNTER — TELEPHONE (OUTPATIENT)
Dept: ENDOSCOPY | Facility: HOSPITAL | Age: 54
End: 2024-10-08
Payer: COMMERCIAL

## 2024-10-08 NOTE — TELEPHONE ENCOUNTER
Attempted contact regarding colonoscopy.  No answer-left direct line phone number to return call.

## 2024-10-11 ENCOUNTER — TELEPHONE (OUTPATIENT)
Dept: CARDIOLOGY | Facility: CLINIC | Age: 54
End: 2024-10-11
Payer: COMMERCIAL

## 2024-10-11 NOTE — TELEPHONE ENCOUNTER
Referral by NICOLASA Pierce for PFO.  Assisted patient with appointment.  Appt letter to be mailed to patient home.